# Patient Record
Sex: FEMALE | Race: BLACK OR AFRICAN AMERICAN | NOT HISPANIC OR LATINO | URBAN - METROPOLITAN AREA
[De-identification: names, ages, dates, MRNs, and addresses within clinical notes are randomized per-mention and may not be internally consistent; named-entity substitution may affect disease eponyms.]

---

## 2021-06-07 ENCOUNTER — INPATIENT (INPATIENT)
Facility: HOSPITAL | Age: 66
LOS: 1 days | Discharge: HOME | End: 2021-06-09
Attending: INTERNAL MEDICINE
Payer: SELF-PAY

## 2021-06-07 VITALS
OXYGEN SATURATION: 97 % | TEMPERATURE: 98 F | SYSTOLIC BLOOD PRESSURE: 174 MMHG | DIASTOLIC BLOOD PRESSURE: 120 MMHG | RESPIRATION RATE: 18 BRPM | HEART RATE: 102 BPM

## 2021-06-07 DIAGNOSIS — I83.90 ASYMPTOMATIC VARICOSE VEINS OF UNSPECIFIED LOWER EXTREMITY: ICD-10-CM

## 2021-06-07 DIAGNOSIS — R07.9 CHEST PAIN, UNSPECIFIED: ICD-10-CM

## 2021-06-07 DIAGNOSIS — I10 ESSENTIAL (PRIMARY) HYPERTENSION: ICD-10-CM

## 2021-06-07 DIAGNOSIS — Z29.9 ENCOUNTER FOR PROPHYLACTIC MEASURES, UNSPECIFIED: ICD-10-CM

## 2021-06-07 DIAGNOSIS — D64.9 ANEMIA, UNSPECIFIED: ICD-10-CM

## 2021-06-07 DIAGNOSIS — Z98.890 OTHER SPECIFIED POSTPROCEDURAL STATES: Chronic | ICD-10-CM

## 2021-06-07 LAB
ALBUMIN SERPL ELPH-MCNC: 3.8 G/DL — SIGNIFICANT CHANGE UP (ref 3.3–5)
ALP SERPL-CCNC: 298 U/L — HIGH (ref 40–120)
ALT FLD-CCNC: <5 U/L — SIGNIFICANT CHANGE UP (ref 4–33)
ANION GAP SERPL CALC-SCNC: 11 MMOL/L — SIGNIFICANT CHANGE UP (ref 7–14)
AST SERPL-CCNC: 15 U/L — SIGNIFICANT CHANGE UP (ref 4–32)
BASE EXCESS BLDV CALC-SCNC: -0.2 MMOL/L — SIGNIFICANT CHANGE UP (ref -3–2)
BASOPHILS # BLD AUTO: 0.01 K/UL — SIGNIFICANT CHANGE UP (ref 0–0.2)
BASOPHILS NFR BLD AUTO: 0.1 % — SIGNIFICANT CHANGE UP (ref 0–2)
BILIRUB SERPL-MCNC: 0.3 MG/DL — SIGNIFICANT CHANGE UP (ref 0.2–1.2)
BLOOD GAS VENOUS - CREATININE: 0.8 MG/DL — SIGNIFICANT CHANGE UP (ref 0.5–1.3)
BLOOD GAS VENOUS COMPREHENSIVE RESULT: SIGNIFICANT CHANGE UP
BUN SERPL-MCNC: 14 MG/DL — SIGNIFICANT CHANGE UP (ref 7–23)
CALCIUM SERPL-MCNC: 8.9 MG/DL — SIGNIFICANT CHANGE UP (ref 8.4–10.5)
CHLORIDE BLDV-SCNC: 108 MMOL/L — SIGNIFICANT CHANGE UP (ref 96–108)
CHLORIDE SERPL-SCNC: 108 MMOL/L — HIGH (ref 98–107)
CO2 SERPL-SCNC: 22 MMOL/L — SIGNIFICANT CHANGE UP (ref 22–31)
CREAT SERPL-MCNC: 0.68 MG/DL — SIGNIFICANT CHANGE UP (ref 0.5–1.3)
EOSINOPHIL # BLD AUTO: 0.05 K/UL — SIGNIFICANT CHANGE UP (ref 0–0.5)
EOSINOPHIL NFR BLD AUTO: 0.7 % — SIGNIFICANT CHANGE UP (ref 0–6)
GAS PNL BLDV: 144 MMOL/L — SIGNIFICANT CHANGE UP (ref 136–146)
GLUCOSE BLDV-MCNC: 119 MG/DL — HIGH (ref 70–99)
GLUCOSE SERPL-MCNC: 114 MG/DL — HIGH (ref 70–99)
HCO3 BLDV-SCNC: 24 MMOL/L — SIGNIFICANT CHANGE UP (ref 20–27)
HCT VFR BLD CALC: 35.7 % — SIGNIFICANT CHANGE UP (ref 34.5–45)
HCT VFR BLDA CALC: 34.7 % — SIGNIFICANT CHANGE UP (ref 34.5–46.5)
HGB BLD CALC-MCNC: 11.2 G/DL — LOW (ref 11.5–15.5)
HGB BLD-MCNC: 10.8 G/DL — LOW (ref 11.5–15.5)
IANC: 5.11 K/UL — SIGNIFICANT CHANGE UP (ref 1.5–8.5)
IMM GRANULOCYTES NFR BLD AUTO: 0.4 % — SIGNIFICANT CHANGE UP (ref 0–1.5)
LACTATE BLDV-MCNC: 1.2 MMOL/L — SIGNIFICANT CHANGE UP (ref 0.5–2)
LYMPHOCYTES # BLD AUTO: 1.61 K/UL — SIGNIFICANT CHANGE UP (ref 1–3.3)
LYMPHOCYTES # BLD AUTO: 22.4 % — SIGNIFICANT CHANGE UP (ref 13–44)
MCHC RBC-ENTMCNC: 26.1 PG — LOW (ref 27–34)
MCHC RBC-ENTMCNC: 30.3 GM/DL — LOW (ref 32–36)
MCV RBC AUTO: 86.2 FL — SIGNIFICANT CHANGE UP (ref 80–100)
MONOCYTES # BLD AUTO: 0.39 K/UL — SIGNIFICANT CHANGE UP (ref 0–0.9)
MONOCYTES NFR BLD AUTO: 5.4 % — SIGNIFICANT CHANGE UP (ref 2–14)
NEUTROPHILS # BLD AUTO: 5.11 K/UL — SIGNIFICANT CHANGE UP (ref 1.8–7.4)
NEUTROPHILS NFR BLD AUTO: 71 % — SIGNIFICANT CHANGE UP (ref 43–77)
NRBC # BLD: 0 /100 WBCS — SIGNIFICANT CHANGE UP
NRBC # FLD: 0 K/UL — SIGNIFICANT CHANGE UP
NT-PROBNP SERPL-SCNC: 669 PG/ML — HIGH
PCO2 BLDV: 39 MMHG — LOW (ref 41–51)
PH BLDV: 7.4 — SIGNIFICANT CHANGE UP (ref 7.32–7.43)
PLATELET # BLD AUTO: 246 K/UL — SIGNIFICANT CHANGE UP (ref 150–400)
PO2 BLDV: 122 MMHG — HIGH (ref 35–40)
POTASSIUM BLDV-SCNC: 3.9 MMOL/L — SIGNIFICANT CHANGE UP (ref 3.4–4.5)
POTASSIUM SERPL-MCNC: 3.9 MMOL/L — SIGNIFICANT CHANGE UP (ref 3.5–5.3)
POTASSIUM SERPL-SCNC: 3.9 MMOL/L — SIGNIFICANT CHANGE UP (ref 3.5–5.3)
PROT SERPL-MCNC: 7.2 G/DL — SIGNIFICANT CHANGE UP (ref 6–8.3)
RBC # BLD: 4.14 M/UL — SIGNIFICANT CHANGE UP (ref 3.8–5.2)
RBC # FLD: 13.6 % — SIGNIFICANT CHANGE UP (ref 10.3–14.5)
SAO2 % BLDV: 98.9 % — HIGH (ref 60–85)
SARS-COV-2 RNA SPEC QL NAA+PROBE: SIGNIFICANT CHANGE UP
SODIUM SERPL-SCNC: 141 MMOL/L — SIGNIFICANT CHANGE UP (ref 135–145)
TROPONIN T, HIGH SENSITIVITY RESULT: 36 NG/L — SIGNIFICANT CHANGE UP
TROPONIN T, HIGH SENSITIVITY RESULT: 36 NG/L — SIGNIFICANT CHANGE UP
TROPONIN T, HIGH SENSITIVITY RESULT: 42 NG/L — SIGNIFICANT CHANGE UP
TSH SERPL-MCNC: 1.81 UIU/ML — SIGNIFICANT CHANGE UP (ref 0.27–4.2)
WBC # BLD: 7.2 K/UL — SIGNIFICANT CHANGE UP (ref 3.8–10.5)
WBC # FLD AUTO: 7.2 K/UL — SIGNIFICANT CHANGE UP (ref 3.8–10.5)

## 2021-06-07 PROCEDURE — 93010 ELECTROCARDIOGRAM REPORT: CPT

## 2021-06-07 PROCEDURE — 71046 X-RAY EXAM CHEST 2 VIEWS: CPT | Mod: 26

## 2021-06-07 PROCEDURE — 99223 1ST HOSP IP/OBS HIGH 75: CPT

## 2021-06-07 PROCEDURE — 93306 TTE W/DOPPLER COMPLETE: CPT | Mod: 26

## 2021-06-07 PROCEDURE — 93970 EXTREMITY STUDY: CPT | Mod: 26

## 2021-06-07 PROCEDURE — 99285 EMERGENCY DEPT VISIT HI MDM: CPT | Mod: 25

## 2021-06-07 RX ORDER — AMLODIPINE BESYLATE 2.5 MG/1
10 TABLET ORAL DAILY
Refills: 0 | Status: DISCONTINUED | OUTPATIENT
Start: 2021-06-07 | End: 2021-06-09

## 2021-06-07 RX ORDER — ASPIRIN/CALCIUM CARB/MAGNESIUM 324 MG
81 TABLET ORAL DAILY
Refills: 0 | Status: DISCONTINUED | OUTPATIENT
Start: 2021-06-07 | End: 2021-06-09

## 2021-06-07 RX ORDER — CARVEDILOL PHOSPHATE 80 MG/1
3.12 CAPSULE, EXTENDED RELEASE ORAL EVERY 12 HOURS
Refills: 0 | Status: DISCONTINUED | OUTPATIENT
Start: 2021-06-07 | End: 2021-06-09

## 2021-06-07 RX ORDER — AMLODIPINE BESYLATE 2.5 MG/1
1 TABLET ORAL
Qty: 0 | Refills: 0 | DISCHARGE

## 2021-06-07 RX ORDER — ENOXAPARIN SODIUM 100 MG/ML
40 INJECTION SUBCUTANEOUS DAILY
Refills: 0 | Status: DISCONTINUED | OUTPATIENT
Start: 2021-06-07 | End: 2021-06-09

## 2021-06-07 RX ADMIN — Medication 81 MILLIGRAM(S): at 10:34

## 2021-06-07 RX ADMIN — ENOXAPARIN SODIUM 40 MILLIGRAM(S): 100 INJECTION SUBCUTANEOUS at 19:01

## 2021-06-07 RX ADMIN — AMLODIPINE BESYLATE 10 MILLIGRAM(S): 2.5 TABLET ORAL at 10:34

## 2021-06-07 RX ADMIN — CARVEDILOL PHOSPHATE 3.12 MILLIGRAM(S): 80 CAPSULE, EXTENDED RELEASE ORAL at 19:01

## 2021-06-07 NOTE — H&P ADULT - PROBLEM SELECTOR PLAN 1
-No active CP at present. Character of pain could be exertion CP (i.e. angina).   -Trend trop till peak.  -Check ECHO, stress ECHO.   -Remote telemetry. Repeat EKG if CP.   -Cont ASA for now.  -Start metoprolol 12.5 BID. -No active CP at present.  -Mild troponin elevation could be 2' T2MI.  -Trend trop till peak. Check ECHO, stress ECHO.   -Remote telemetry. Repeat EKG if CP.   -Cont ASA for now.  -Start metoprolol 12.5 BID pending stress.  -Per pt - had contrast imaging in Nov 2020 and developed tongue/lip swelling. Spoke w/ radiology here - would not approve contrast study. Lower suspicion for dissection at this time given spectrum of sx. Current imaging does not show widened mediastinum. Will check bilateral arm BPs. Will also obtain records from [facility] for study done at the time. Can check MRA of chest if further workup is indicated as discuss w/ rads. -No active CP at present.  -Mild troponin elevation could be 2' T2MI.  -Trend trop till peak. Check ECHO, stress ECHO.   -Remote telemetry. Repeat EKG if CP.   -Cont ASA for now.  -Start carvedilol pending stress.  -Per pt - had contrast imaging in Nov 2020 and developed tongue/lip swelling. Spoke w/ radiology here - would not approve contrast study. Lower suspicion for dissection at this time given spectrum of sx. Current imaging does not show widened mediastinum. Will check bilateral arm BPs. Will also obtain records from [facility] for study done at the time. Can check MRA of chest if further workup is indicated as discuss w/ rads.

## 2021-06-07 NOTE — H&P ADULT - PROBLEM SELECTOR PLAN 3
-No prior baseline. No overt blood loss.  -Will check iron studies.  -Pt will need OP follow up to ensure age-appropriate ca screening.

## 2021-06-07 NOTE — ED ADULT NURSE REASSESSMENT NOTE - NS ED NURSE REASSESS COMMENT FT1
patient aaox4. ambulatory w/o assist. came in with chest pain, headache, nausea, diaphoresis, lewis. currently in nad. denies chest pain, dizziness, weakness, numbness, tingling, lightheadedness, n/v, sob, at this time. cardiac monitor nsr. respirations even and unlabored. made comfortable. safety maintained. call bell within reach. will continue to monitor.

## 2021-06-07 NOTE — H&P ADULT - NSHPPHYSICALEXAM_GEN_ALL_CORE
Vital Signs Last 24 Hrs  T(C): 36.7 (07 Jun 2021 06:18), Max: 36.9 (07 Jun 2021 00:20)  T(F): 98 (07 Jun 2021 06:18), Max: 98.5 (07 Jun 2021 00:20)  HR: 81 (07 Jun 2021 03:40) (81 - 102)  BP: 172/107 (07 Jun 2021 06:18) (172/107 - 175/107)  BP(mean): --  RR: 20 (07 Jun 2021 06:18) (17 - 20)  SpO2: 98% (07 Jun 2021 06:18) (97% - 99%)    Gen- In bed, comfortable, NAD  Eyes- EOMI, PERRLA, nonicteric.  EMNT- Fair dentition. MMM. No tonsilar exudates. No posterior pharynx erythema.  Neck- Supple. No masses. No tracheal deviation.  Resp- CTAB, good effort. No r/r/w. No accessory muscle use.  CVS- Tachy, RRR, S1S2, no g/r/m. L>R LE edema.  GI- Soft obese abd, NT, ND, +BSx4. No HSM.  MSK- No C/C. ROM intact. No crepitus.  Neuro- CN II-XII intact. Speech fluent/face symmetric. Sensation intact.  Skin- No rashes/ulcers. Warm/moist.  Psych- AAOx3. Appropriate mood/affect.

## 2021-06-07 NOTE — ED ADULT TRIAGE NOTE - CHIEF COMPLAINT QUOTE
pt did not take her BP medications today, while she was walking up stairs when she developed mid sternal chest pain described as burning. patient received 324 of ASA and 2 sprays of nitro. patient states her chest pain is resolved but c/o headache. pt did not take her BP medications today, while she was walking up stairs when she developed mid sternal chest pain described as burning. patient received 324 of ASA and 2 sprays of nitro. patient states her chest pain is resolved but c/o headache. 20g iv placed by EMS.

## 2021-06-07 NOTE — CONSULT NOTE ADULT - ASSESSMENT
65F with PMH of uncontrolled HTN, varicose veins  presenting w/ CP.     1. Chest pain   -with atypical features  -HS trop 36-42, no acs on ECG, chest xray unremarkable   -uncontrolled HTN noted   -Pending echo to eval LV fx  -+CAD risk factors, awaiting stress test for ischemic eval   -low dose ASA   -check FLP, ha1c     2. Uncontrolled HTN   -c/w norvasc 10 mg daily  -start coreg  3.125 mg BID     3. Varicose veins  -reports chronic LE edema  -check dopplers     dvt ppx

## 2021-06-07 NOTE — H&P ADULT - NSHPSOCIALHISTORY_GEN_ALL_CORE
No ETOH, drug, tobacco use.  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   Lives with  and son.

## 2021-06-07 NOTE — CONSULT NOTE ADULT - TIME BILLING
Patient seen and examined.  Agree with above NP note.  65F with PMH of uncontrolled HTN, varicose veins  presenting w/ CP.     #Chest pain   -with atypical features but with CAD risk factors  -uncontrolled HTN noted   -echo to eval LV fx  -awaiting stress test for ischemic eval   -low dose ASA   -check FLP, ha1c     #Uncontrolled HTN   -c/w norvasc 10 mg daily  -start coreg  3.125 mg BID     #Varicose veins  -reports chronic LE edema  -no dvt on dopplers     #anemia w/u  dvt ppx

## 2021-06-07 NOTE — CONSULT NOTE ADULT - SUBJECTIVE AND OBJECTIVE BOX
CARDIOLOGY CONSULT - Dr. Gauthier         HPI:  65F with PMH of uncontrolled HTN presenting w/ CP. Pt was at work overnight when she climb 3 flight of stairs. Immediately after, she experienced a burning sensation that started on her  left  side w/ radiation to the left shoulder blade and left arm. Pt had associated diaphoresis.  After administration of ASA and NTG while enroute, the patient reports symptomatic improvement. She had a prior episode of this back on Thursday. Episode lasted a few minutes and went away on its own after she leaned forward. She reports longstanding poor control of HTN. Her usual BPs at home averages 190s/100s. She reports following at [facility] for her primary care, but does not recall name of her MD. She also reports hospitalization back in Nov 2020 for high blood pressure at [facility]. Pt recalled getting CT and got contrast leading to drug reaction (lip/tongue swelling). There have been no changes to her medications. She reports baseline 2 pillow orthopnea without PND. She reports baseline left leg swelling 2' varicose vein. Otherwise, pt denied any fevers/chills, NV, vision changes, HA, sore throat, cough, SOB, abdominal pain, diarrhea, dysuria. She reports no travels, recent sick contacts. No recent cardiac hx/ work up   ROS otherwise negative       PAST MEDICAL & SURGICAL HISTORY:  HTN (hypertension)    History of varicose vein ligation and stripping            PREVIOUS DIAGNOSTIC TESTING:    [ ] Echocardiogram:  [ ]  Catheterization:  [ ] Stress Test:  	    MEDICATIONS:  Home Medications:  amLODIPine 10 mg oral tablet: 1 tab(s) orally once a day (07 Jun 2021 09:18)      MEDICATIONS  (STANDING):  amLODIPine   Tablet 10 milliGRAM(s) Oral daily  aspirin enteric coated 81 milliGRAM(s) Oral daily  enoxaparin Injectable 40 milliGRAM(s) SubCutaneous daily      FAMILY HISTORY:  No pertinent family history in first degree relatives        SOCIAL HISTORY:    [x ] Non-smoker  [ ] Smoker  [ ] Alcohol    Allergies    contrast media (iodine-based) (Swelling)    Intolerances    	    REVIEW OF SYSTEMS:  CONSTITUTIONAL: No fever, weight loss, or fatigue  EYES: No eye pain, visual disturbances, or discharge  ENMT:  No difficulty hearing, tinnitus, vertigo; No sinus or throat pain  NECK: No pain or stiffness  RESPIRATORY: No cough, wheezing, chills or hemoptysis; No Shortness of Breath  CARDIOVASCULAR: see hpi  GASTROINTESTINAL: No abdominal or epigastric pain. No nausea, vomiting, or hematemesis; No diarrhea or constipation. No melena or hematochezia.  GENITOURINARY: No dysuria, frequency, hematuria, or incontinence  NEUROLOGICAL: No headaches, memory loss, loss of strength, numbness, or tremors  SKIN: No itching, burning, rashes, or lesions   	    [x ] All others negative	  [ ] Unable to obtain    PHYSICAL EXAM:  T(C): 36.3 (06-07-21 @ 10:24), Max: 36.9 (06-07-21 @ 00:20)  HR: 91 (06-07-21 @ 10:24) (81 - 102)  BP: 179/98 (06-07-21 @ 10:24) (172/107 - 179/98)  RR: 16 (06-07-21 @ 10:24) (16 - 20)  SpO2: 98% (06-07-21 @ 10:24) (97% - 99%)  Wt(kg): --  I&O's Summary      Appearance: Normal	  Psychiatry: A & O x 3, Mood & affect appropriate  HEENT:   Normal oral mucosa, PERRL, EOMI	  Lymphatic: No lymphadenopathy  Cardiovascular: Normal S1 S2,RRR, No JVD, No murmurs  Respiratory: Lungs clear to auscultation	  Gastrointestinal:  Soft, Non-tender, + BS	  Skin: No rashes, No ecchymoses, No cyanosis	  Neurologic: Non-focal  Extremities:  + le EDEMA     TELEMETRY: 	    ECG:  nsr hr 95 bpm, min criteria for LVH  possible ant infarct   RADIOLOGY:    < from: Xray Chest 2 Views PA/Lat (06.07.21 @ 03:14) >  FINDINGS:    LUNGS: The lungs are clear.    PLEURA: No pleural effusion or pneumothorax.    HEART AND MEDIASTINUM: Cardiomediastinal silhouette size is within normal limits.    SKELETON: No significant abnormality.      IMPRESSION:    Clear lungs.      OTHER: 	  	  LABS:	 	    CARDIAC MARKERS:  Troponin T, High Sensitivity Result: 42 ng/L (06-07 @ 04:29)  Troponin T, High Sensitivity Result: 36 ng/L (06-07 @ 03:18)                                  10.8   7.20  )-----------( 246      ( 07 Jun 2021 03:18 )             35.7     06-07    141  |  108<H>  |  14  ----------------------------<  114<H>  3.9   |  22  |  0.68    Ca    8.9      07 Jun 2021 03:18    TPro  7.2  /  Alb  3.8  /  TBili  0.3  /  DBili  x   /  AST  15  /  ALT  <5  /  AlkPhos  298<H>  06-07      proBNP: Serum Pro-Brain Natriuretic Peptide: 669 pg/mL (06-07 @ 03:18)    Lipid Profile:   HgA1c:   TSH:

## 2021-06-07 NOTE — H&P ADULT - ASSESSMENT
65F with PMH of HTN who presents w/ CP. On arrival, pt noted to markedly hypertensives, but this appears to be her baseline. Initial labs notable for hsTrop 36 -> 42. Chest XR showed no acute imaging abnormalities. EKG showed no acute ischemic changes. Given risks factors - admitted for r/o ACS. 65F with PMH of HTN who presents w/ CP. On arrival, pt noted to markedly hypertensives, but this appears to be her baseline. Initial labs notable for hsTrop 36 -> 42. Chest XR showed no acute imaging abnormalities. EKG showed no acute ischemic changes. Exertional nature concerning for anginal CP. Given risks factors - admitted for r/o ACS.

## 2021-06-07 NOTE — ED ADULT NURSE NOTE - OBJECTIVE STATEMENT
pt a&o x 3 c/o chest pain at 2240 last night. states she was going up the stairs when it occurred. non radiating. denies sob, n/v, ha or dizziness. no current complaints. md assessed. labs drawn and sent. arrived with left hand 20g by ems. safety maintained. will endorse to primary rn.

## 2021-06-07 NOTE — H&P ADULT - PROBLEM SELECTOR PLAN 2
-Hx of poor control.  -Cont home dose of amlodipine.  -Can introduce second agent - will reassess response after restarting home med.  -Check FLP, A1c, TSH -Hx of poor control. Baseline usually 190s/100s per pt.  -Cont home dose of amlodipine.  -Start metoprolol pending stress.  -Check FLP, A1c, TSH

## 2021-06-07 NOTE — ED ADULT NURSE REASSESSMENT NOTE - NS ED NURSE REASSESS COMMENT FT1
patient aaox4. ambulatory w/o assist. came in with chest pain, headache, nausea, diaphoresis. currently in nad. denies chest pain, dizziness, weakness, numbness, tingling, lightheadedness, n/v, sob, at this time. cardiac monitor nsr. made comfortable. saftey maintained. call bell within reach. will continue to monitor.

## 2021-06-07 NOTE — ED PROVIDER NOTE - NS ED ROS FT
Gen: No fever, normal appetite  Eyes: No eye irritation or discharge  ENT: No ear pain, congestion, sore throat  Resp: No cough or trouble breathing  Cardiovascular: chest pain   Gastroenteric: No nausea/vomiting, diarrhea, constipation  :  No change in urine output; no dysuria  MS: No joint or muscle pain  Skin: No rashes  Neuro: headache after nitro   Remainder negative, except as per the HPI

## 2021-06-07 NOTE — ED PROVIDER NOTE - PHYSICAL EXAMINATION
GEN - NAD; well appearing; A+O x3   HEAD - NC/AT     EYES - EOMI, no conjunctival pallor, no scleral icterus  ENT -   right ear significant keloid, mucous membranes  moist , no discharge      NECK - Neck supple  PULM - CTA b/l,  symmetric breath sounds  COR -  RRR, S1 S2, no murmurs  ABD - , ND, NT, soft, no guarding, no rebound, no masses    BACK - no CVA tenderness, nontender spine     EXTREMS - no edema, no deformity, warm and well perfused    SKIN - no rash or bruising      NEUROLOGIC - alert, sensation nl, motor 5/5 RUE/LUE/RLE/LLE

## 2021-06-07 NOTE — H&P ADULT - HISTORY OF PRESENT ILLNESS
65F with PMH of uncontrolled HTN presenting w/ CP. Pt was at work overnight when she climb 3 flight of stairs. Immediately after, she experienced left sided chest pain w/ radiation to the left shoulder blade and left arm. Pt had associated diaphoresis. She characterized the pain as "burning" and as if something was "eating" inside of her. EMS was called. After administration of ASA and NTG while enroute, the patient reports symptomatic improvement. She had a prior episode of this back on Thursday. Episode lasted a few minutes and went away on its own. She reports longstanding poor control of HTN. Her usual BPs at home averages 190s/100s. She reports following at [facility] for her primary care, but does not recall name of her MD. She also reports hospitalization back in Nov 2020 for high blood pressure at [facility]. Pt recalled getting CT and got contrast leading to drug reaction (lip/tongue swelling). There have been no changes to her medications. She reports baseline 2 pillow orthopnea without PND. She reports baseline left leg swelling 2' varicose vein. Otherwise, pt denied any fevers/chills, NV, vision changes, HA, sore throat, cough, SOB, abdominal pain, diarrhea, dysuria. She reports no travels, recent sick contacts.     ED Course: 174/120, 102, 18, 98.5F, 97% RA. No meds given.

## 2021-06-07 NOTE — ED ADULT NURSE REASSESSMENT NOTE - NS ED NURSE REASSESS COMMENT FT1
Pt A&ox4 resting comfortably. Breathing even and unlabored. Pt denies headache, chest pain, SOB, or dizziness at this time. Will give report to CDU

## 2021-06-07 NOTE — ED ADULT NURSE NOTE - CHIEF COMPLAINT QUOTE
pt did not take her BP medications today, while she was walking up stairs when she developed mid sternal chest pain described as burning. patient received 324 of ASA and 2 sprays of nitro. patient states her chest pain is resolved but c/o headache. 20g iv placed by EMS.

## 2021-06-07 NOTE — ED PROVIDER NOTE - CLINICAL SUMMARY MEDICAL DECISION MAKING FREE TEXT BOX
pt with chest pain w/ hx of concerning for stable angina w/ nonspecific changes on EKG. Will check troponin, ekg, monitor on telemetry, rule out acute coronary syndrome.

## 2021-06-07 NOTE — H&P ADULT - NSHPLABSRESULTS_GEN_ALL_CORE
10.8   7.20  )-----------( 246      ( 07 Jun 2021 03:18 )             35.7     06-07    141  |  108<H>  |  14  ----------------------------<  114<H>  3.9   |  22  |  0.68    Ca    8.9      07 Jun 2021 03:18    TPro  7.2  /  Alb  3.8  /  TBili  0.3  /  DBili  x   /  AST  15  /  ALT  <5  /  AlkPhos  298<H>  06-07              COVID-19 PCR: NotDetec (07 Jun 2021 04:42)    Serum Pro-Brain Natriuretic Peptide: 669 pg/mL (06-07 @ 03:18)        Chest XR - personally reviewed - no acute cardiopulmonary processes. No infiltrates. No widened mediastinum. Official read reviewed.    EKG - personally reviewed - SR 95, no acute ST/T changes. .

## 2021-06-08 ENCOUNTER — TRANSCRIPTION ENCOUNTER (OUTPATIENT)
Age: 66
End: 2021-06-08

## 2021-06-08 LAB
A1C WITH ESTIMATED AVERAGE GLUCOSE RESULT: 5.4 % — SIGNIFICANT CHANGE UP (ref 4–5.6)
ANION GAP SERPL CALC-SCNC: 14 MMOL/L — SIGNIFICANT CHANGE UP (ref 7–14)
BUN SERPL-MCNC: 21 MG/DL — SIGNIFICANT CHANGE UP (ref 7–23)
CALCIUM SERPL-MCNC: 9.1 MG/DL — SIGNIFICANT CHANGE UP (ref 8.4–10.5)
CHLORIDE SERPL-SCNC: 104 MMOL/L — SIGNIFICANT CHANGE UP (ref 98–107)
CHOLEST SERPL-MCNC: 189 MG/DL — SIGNIFICANT CHANGE UP
CO2 SERPL-SCNC: 23 MMOL/L — SIGNIFICANT CHANGE UP (ref 22–31)
COVID-19 SPIKE DOMAIN AB INTERP: POSITIVE
COVID-19 SPIKE DOMAIN ANTIBODY RESULT: >250 U/ML — HIGH
CREAT SERPL-MCNC: 0.88 MG/DL — SIGNIFICANT CHANGE UP (ref 0.5–1.3)
ESTIMATED AVERAGE GLUCOSE: 108 MG/DL — SIGNIFICANT CHANGE UP (ref 68–114)
FERRITIN SERPL-MCNC: 141 NG/ML — SIGNIFICANT CHANGE UP (ref 15–150)
GLUCOSE SERPL-MCNC: 101 MG/DL — HIGH (ref 70–99)
HCT VFR BLD CALC: 37.5 % — SIGNIFICANT CHANGE UP (ref 34.5–45)
HCV AB S/CO SERPL IA: 0.46 S/CO — SIGNIFICANT CHANGE UP (ref 0–0.99)
HCV AB SERPL-IMP: SIGNIFICANT CHANGE UP
HDLC SERPL-MCNC: 65 MG/DL — SIGNIFICANT CHANGE UP
HGB BLD-MCNC: 11.5 G/DL — SIGNIFICANT CHANGE UP (ref 11.5–15.5)
IRON SATN MFR SERPL: 23 % — SIGNIFICANT CHANGE UP (ref 14–50)
IRON SATN MFR SERPL: 58 UG/DL — SIGNIFICANT CHANGE UP (ref 30–160)
LIPID PNL WITH DIRECT LDL SERPL: 108 MG/DL — HIGH
MAGNESIUM SERPL-MCNC: 2.1 MG/DL — SIGNIFICANT CHANGE UP (ref 1.6–2.6)
MCHC RBC-ENTMCNC: 26.7 PG — LOW (ref 27–34)
MCHC RBC-ENTMCNC: 30.7 GM/DL — LOW (ref 32–36)
MCV RBC AUTO: 87 FL — SIGNIFICANT CHANGE UP (ref 80–100)
NON HDL CHOLESTEROL: 124 MG/DL — SIGNIFICANT CHANGE UP
NRBC # BLD: 0 /100 WBCS — SIGNIFICANT CHANGE UP
NRBC # FLD: 0 K/UL — SIGNIFICANT CHANGE UP
PHOSPHATE SERPL-MCNC: 4.2 MG/DL — SIGNIFICANT CHANGE UP (ref 2.5–4.5)
PLATELET # BLD AUTO: 254 K/UL — SIGNIFICANT CHANGE UP (ref 150–400)
POTASSIUM SERPL-MCNC: 3.7 MMOL/L — SIGNIFICANT CHANGE UP (ref 3.5–5.3)
POTASSIUM SERPL-SCNC: 3.7 MMOL/L — SIGNIFICANT CHANGE UP (ref 3.5–5.3)
RBC # BLD: 4.31 M/UL — SIGNIFICANT CHANGE UP (ref 3.8–5.2)
RBC # FLD: 13.6 % — SIGNIFICANT CHANGE UP (ref 10.3–14.5)
SARS-COV-2 IGG+IGM SERPL QL IA: >250 U/ML — HIGH
SARS-COV-2 IGG+IGM SERPL QL IA: POSITIVE
SODIUM SERPL-SCNC: 141 MMOL/L — SIGNIFICANT CHANGE UP (ref 135–145)
TIBC SERPL-MCNC: 250 UG/DL — SIGNIFICANT CHANGE UP (ref 220–430)
TRIGL SERPL-MCNC: 78 MG/DL — SIGNIFICANT CHANGE UP
UIBC SERPL-MCNC: 192 UG/DL — SIGNIFICANT CHANGE UP (ref 110–370)
WBC # BLD: 4.75 K/UL — SIGNIFICANT CHANGE UP (ref 3.8–10.5)
WBC # FLD AUTO: 4.75 K/UL — SIGNIFICANT CHANGE UP (ref 3.8–10.5)

## 2021-06-08 PROCEDURE — 93018 CV STRESS TEST I&R ONLY: CPT | Mod: GC

## 2021-06-08 PROCEDURE — 78452 HT MUSCLE IMAGE SPECT MULT: CPT | Mod: 26

## 2021-06-08 PROCEDURE — 93016 CV STRESS TEST SUPVJ ONLY: CPT | Mod: GC

## 2021-06-08 RX ADMIN — ENOXAPARIN SODIUM 40 MILLIGRAM(S): 100 INJECTION SUBCUTANEOUS at 17:40

## 2021-06-08 RX ADMIN — AMLODIPINE BESYLATE 10 MILLIGRAM(S): 2.5 TABLET ORAL at 06:11

## 2021-06-08 RX ADMIN — CARVEDILOL PHOSPHATE 3.12 MILLIGRAM(S): 80 CAPSULE, EXTENDED RELEASE ORAL at 17:40

## 2021-06-08 RX ADMIN — CARVEDILOL PHOSPHATE 3.12 MILLIGRAM(S): 80 CAPSULE, EXTENDED RELEASE ORAL at 06:11

## 2021-06-08 RX ADMIN — Medication 81 MILLIGRAM(S): at 17:40

## 2021-06-08 NOTE — DISCHARGE NOTE PROVIDER - NSDCCPCAREPLAN_GEN_ALL_CORE_FT
PRINCIPAL DISCHARGE DIAGNOSIS  Diagnosis: Acute chest pain  Assessment and Plan of Treatment: Your cardiac cath showed that you have normal coronary arteries. Please follow up with the cardiology clinic.       PRINCIPAL DISCHARGE DIAGNOSIS  Diagnosis: Acute chest pain  Assessment and Plan of Treatment: Your cardiac cath showed that you have mild disease in your arteries. Please follow up with the cardiology clinic.

## 2021-06-08 NOTE — DISCHARGE NOTE PROVIDER - HOSPITAL COURSE
this appears to be her baseline. Initial labs notable for hsTrop 36 -> 42. Chest XR showed no acute imaging abnormalities. EKG showed no acute ischemic changes. Exertional nature concerning for anginal CP. Given risks factors - admitted for r/o ACS.    Chest pain.    Plan: -No active CP at present.  -Mild troponin elevation could be 2' T2MI.  -Trend trop till peak. Check ECHO, stress ECHO.   -Remote telemetry. Repeat EKG if CP.   -Cont ASA for now.  -Start carvedilol pending stress.  -Per pt - had contrast imaging in Nov 2020 and developed tongue/lip swelling. Spoke w/ radiology here - would not approve contrast study. Lower suspicion for dissection at this time given spectrum of sx. Current imaging does not show widened mediastinum. Will check bilateral arm BPs. Will also obtain records from [facility] for study done at the time. Can check MRA of chest if further workup is indicated as discuss w/ rads.   -TTE EF 49%- Moderately dilated left atrium.  LA volume index = 47, cc/m2.  Eccentric left ventricular hypertrophy (dilated left  ventricle with normal relative wall thickness).  Mild global left ventricular systolic dysfunction.  Normal right ventricular size and function.  -NST The left ventricle was enlarged. There is a small, mild to moderate defect in distal inferior wall that is reversible, suggestive of ischemia.There is a small, very  mild defect in anteroseptal wall that is reversible, suggestive of mild ischemia.   -s/p cardiac cath-------------------------------------------------------------------------      Essential hypertension.    Plan: -Hx of poor control. Baseline usually 190s/100s per pt.  -Cont home dose of amlodipine.  -Start metoprolol pending stress.  -Check FLP, A1c, TSH.      Anemia.    Plan: -No prior baseline. No overt blood loss.  -Will check iron studies.  -Pt will need OP follow up to ensure age-appropriate ca screening.      Varicose veins.    Plan: -Left leg swelling - though baseline - she does have tenderness on palpation.  -Check LE venous duplex.     Need for prophylactic measure.  Plan: DVT ppx- Lovenox.    this appears to be her baseline. Initial labs notable for hsTrop 36 -> 42. Chest XR showed no acute imaging abnormalities. EKG showed no acute ischemic changes. Exertional nature concerning for anginal CP. Given risks factors - admitted for r/o ACS.    Chest pain.    Plan: -No active CP at present.  -Mild troponin elevation could be 2' T2MI.  -Trend trop till peak. Check ECHO, stress ECHO.   -Remote telemetry. Repeat EKG if CP.   -Cont ASA for now.  -Start carvedilol pending stress.  -Per pt - had contrast imaging in Nov 2020 and developed tongue/lip swelling. Spoke w/ radiology here - would not approve contrast study. Lower suspicion for dissection at this time given spectrum of sx. Current imaging does not show widened mediastinum. Will check bilateral arm BPs. Will also obtain records from [facility] for study done at the time. Can check MRA of chest if further workup is indicated as discuss w/ rads.   -TTE EF 49%- Moderately dilated left atrium.  LA volume index = 47, cc/m2.  Eccentric left ventricular hypertrophy (dilated left  ventricle with normal relative wall thickness).  Mild global left ventricular systolic dysfunction.  Normal right ventricular size and function.  -NST The left ventricle was enlarged. There is a small, mild to moderate defect in distal inferior wall that is reversible, suggestive of ischemia.There is a small, very  mild defect in anteroseptal wall that is reversible, suggestive of mild ischemia.   -s/p cardiac cath which showed NCA    Patient is now medically stable for discharge home.

## 2021-06-08 NOTE — PROGRESS NOTE ADULT - ASSESSMENT
65F with PMH of uncontrolled HTN, varicose veins presenting w/ CP.     #Chest pain   -with atypical features  -HS trop 36-42, no acs on ECG, chest xray unremarkable -  -uncontrolled HTN noted. now improved   -echo w/ mild global LV sys dysfunction, EF 49%  -F/U stress results   -low dose ASA     2. Uncontrolled HTN   -improved   -c/w norvasc 10 mg daily, coreg  3.125 mg BID     3. Varicose veins  -reports chronic LE edema  -LE doppler negative for DVT.      dvt ppx   65F with PMH of uncontrolled HTN, varicose veins presenting w/ CP.     #Chest pain   -with atypical features  -HS trop 36-42, no acs on ECG, chest xray unremarkable -  -uncontrolled HTN noted. now improved   -echo w/ mild global LV sys dysfunction, EF 49%  -F/U stress results   -low dose ASA     #Uncontrolled HTN   -improved   -c/w norvasc 10 mg daily, coreg  3.125 mg BID     #Varicose veins  -reports chronic LE edema  -LE doppler negative for DVT.      dvt ppx

## 2021-06-08 NOTE — DISCHARGE NOTE PROVIDER - NSDCMRMEDTOKEN_GEN_ALL_CORE_FT
amLODIPine 10 mg oral tablet: 1 tab(s) orally once a day   amLODIPine 10 mg oral tablet: 1 tab(s) orally once a day  carvedilol 3.125 mg oral tablet: 1 tab(s) orally every 12 hours   amLODIPine 10 mg oral tablet: 1 tab(s) orally once a day  aspirin 81 mg oral delayed release tablet: 1 tab(s) orally once a day  carvedilol 3.125 mg oral tablet: 1 tab(s) orally every 12 hours

## 2021-06-08 NOTE — DISCHARGE NOTE PROVIDER - CARE PROVIDER_API CALL
Peter Gauthier (MD)  Cardiovascular Disease; Interventional Cardiology; Nuclear Cardiology  [address], [address]  [place] [address]  Phone: [phone]  Fax: [phone]  Established Patient  Follow Up Time: 2 weeks   Cardiology Clinic,   Phone: [phone]  Fax: (   )    -  Follow Up Time:

## 2021-06-08 NOTE — CHART NOTE - NSCHARTNOTEFT_GEN_A_CORE
patient scheduled for cardiac catheterization on 6/9 with known IV contrast allergy. ordered for Prednisone 50mg po [id number] prior to procedure per protocol as d/w Dr JHONATHAN Murcia  NPO with meds/sip of water after midnight

## 2021-06-08 NOTE — DISCHARGE NOTE PROVIDER - PROVIDER TOKENS
PROVIDER:[TOKEN:[[id number]],FOLLOWUP:[2 weeks],ESTABLISHEDPATIENT:[T]] FREE:[LAST:[Cardiology Clinic],PHONE:[phone]],FAX:[(   )    -]]

## 2021-06-09 ENCOUNTER — TRANSCRIPTION ENCOUNTER (OUTPATIENT)
Age: 66
End: 2021-06-09

## 2021-06-09 VITALS
SYSTOLIC BLOOD PRESSURE: 149 MMHG | OXYGEN SATURATION: 99 % | HEART RATE: 100 BPM | TEMPERATURE: 98 F | DIASTOLIC BLOOD PRESSURE: 93 MMHG | RESPIRATION RATE: 18 BRPM

## 2021-06-09 LAB
ANION GAP SERPL CALC-SCNC: 12 MMOL/L — SIGNIFICANT CHANGE UP (ref 7–14)
BUN SERPL-MCNC: 19 MG/DL — SIGNIFICANT CHANGE UP (ref 7–23)
CALCIUM SERPL-MCNC: 9.1 MG/DL — SIGNIFICANT CHANGE UP (ref 8.4–10.5)
CHLORIDE SERPL-SCNC: 107 MMOL/L — SIGNIFICANT CHANGE UP (ref 98–107)
CO2 SERPL-SCNC: 23 MMOL/L — SIGNIFICANT CHANGE UP (ref 22–31)
CREAT SERPL-MCNC: 0.78 MG/DL — SIGNIFICANT CHANGE UP (ref 0.5–1.3)
GLUCOSE SERPL-MCNC: 141 MG/DL — HIGH (ref 70–99)
HCT VFR BLD CALC: 37.2 % — SIGNIFICANT CHANGE UP (ref 34.5–45)
HGB BLD-MCNC: 11.6 G/DL — SIGNIFICANT CHANGE UP (ref 11.5–15.5)
MAGNESIUM SERPL-MCNC: 2 MG/DL — SIGNIFICANT CHANGE UP (ref 1.6–2.6)
MCHC RBC-ENTMCNC: 26.5 PG — LOW (ref 27–34)
MCHC RBC-ENTMCNC: 31.2 GM/DL — LOW (ref 32–36)
MCV RBC AUTO: 85.1 FL — SIGNIFICANT CHANGE UP (ref 80–100)
NRBC # BLD: 0 /100 WBCS — SIGNIFICANT CHANGE UP
NRBC # FLD: 0 K/UL — SIGNIFICANT CHANGE UP
PHOSPHATE SERPL-MCNC: 3 MG/DL — SIGNIFICANT CHANGE UP (ref 2.5–4.5)
PLATELET # BLD AUTO: 243 K/UL — SIGNIFICANT CHANGE UP (ref 150–400)
POTASSIUM SERPL-MCNC: 4.3 MMOL/L — SIGNIFICANT CHANGE UP (ref 3.5–5.3)
POTASSIUM SERPL-SCNC: 4.3 MMOL/L — SIGNIFICANT CHANGE UP (ref 3.5–5.3)
RBC # BLD: 4.37 M/UL — SIGNIFICANT CHANGE UP (ref 3.8–5.2)
RBC # FLD: 13.6 % — SIGNIFICANT CHANGE UP (ref 10.3–14.5)
SODIUM SERPL-SCNC: 142 MMOL/L — SIGNIFICANT CHANGE UP (ref 135–145)
WBC # BLD: 4.78 K/UL — SIGNIFICANT CHANGE UP (ref 3.8–10.5)
WBC # FLD AUTO: 4.78 K/UL — SIGNIFICANT CHANGE UP (ref 3.8–10.5)

## 2021-06-09 PROCEDURE — 93458 L HRT ARTERY/VENTRICLE ANGIO: CPT | Mod: 26

## 2021-06-09 RX ORDER — HYDRALAZINE HCL 50 MG
5 TABLET ORAL ONCE
Refills: 0 | Status: COMPLETED | OUTPATIENT
Start: 2021-06-09 | End: 2021-06-09

## 2021-06-09 RX ORDER — CARVEDILOL PHOSPHATE 80 MG/1
1 CAPSULE, EXTENDED RELEASE ORAL
Qty: 60 | Refills: 0
Start: 2021-06-09 | End: 2021-07-08

## 2021-06-09 RX ORDER — ASPIRIN/CALCIUM CARB/MAGNESIUM 324 MG
1 TABLET ORAL
Qty: 30 | Refills: 0
Start: 2021-06-09 | End: 2021-07-08

## 2021-06-09 RX ORDER — HYDRALAZINE HCL 50 MG
5 TABLET ORAL ONCE
Refills: 0 | Status: DISCONTINUED | OUTPATIENT
Start: 2021-06-09 | End: 2021-06-09

## 2021-06-09 RX ADMIN — Medication 81 MILLIGRAM(S): at 12:27

## 2021-06-09 RX ADMIN — Medication 5 MILLIGRAM(S): at 16:06

## 2021-06-09 RX ADMIN — AMLODIPINE BESYLATE 10 MILLIGRAM(S): 2.5 TABLET ORAL at 05:38

## 2021-06-09 RX ADMIN — Medication 50 MILLIGRAM(S): at 00:31

## 2021-06-09 RX ADMIN — CARVEDILOL PHOSPHATE 3.12 MILLIGRAM(S): 80 CAPSULE, EXTENDED RELEASE ORAL at 05:38

## 2021-06-09 RX ADMIN — CARVEDILOL PHOSPHATE 3.12 MILLIGRAM(S): 80 CAPSULE, EXTENDED RELEASE ORAL at 17:40

## 2021-06-09 RX ADMIN — Medication 50 MILLIGRAM(S): at 05:35

## 2021-06-09 RX ADMIN — Medication 50 MILLIGRAM(S): at 12:26

## 2021-06-09 NOTE — PROGRESS NOTE ADULT - TIME BILLING
Agree with above NP note.  CV stable  stress abnl  plan for cardiac cath today  pt s/p premedication for contrast allergy  Cont current BP meds

## 2021-06-09 NOTE — PROGRESS NOTE ADULT - ASSESSMENT
Echo 6/7/21: ef 49 mild global LV sys dysfunction,   Stress test 6/8/21:  evidence of ischemia, revealing markedly reduced  LV function.  Post-stress gated wall motion analysis was performed (LVEF = 37 %;LVEDV = 162 ml.), revealing  moderate diffuse hypokinesis.      a/p  65F with PMH of uncontrolled HTN, varicose veins presenting w/ CP.     #Chest pain   -with atypical features  -HS trop 36-42, no acs on ECG, chest xray unremarkable -  -uncontrolled HTN noted. now improved   -echo w/ mild global LV sys dysfunction, EF 49%  -stress test abnormal with evidence of ischemia, revealing markedly reduced  LV function.  Post-stress gated wall motion analysis was performed (LVEF = 37 %;LVEDV = 162 ml.), revealing  moderate diffuse hypokinesis.  -plan for cardiac cath , + contrast alllx- premedicate with Prednisone 50mg po [id number] initiated  prior to procedure per protocol   -c/w low dose ASA     #Uncontrolled HTN   -improved   -c/w norvasc 10 mg daily, coreg  3.125 mg BID     #Varicose veins  -reports chronic LE edema  -LE doppler negative for DVT.      dvt ppx

## 2021-06-09 NOTE — DISCHARGE NOTE NURSING/CASE MANAGEMENT/SOCIAL WORK - PATIENT PORTAL LINK FT
You can access the FollowMyHealth Patient Portal offered by [facility] by registering at the following website: [email or web address]. By joining [facility]’s FollowMyHealth portal, you will also be able to view your health information using other applications (apps) compatible with our system.

## 2021-06-09 NOTE — PROGRESS NOTE ADULT - SUBJECTIVE AND OBJECTIVE BOX
CARDIOLOGY FOLLOW UP - Dr. Gauthier  DATE OF SERVICE: 6/9/21     CC no cp or sob       REVIEW OF SYSTEMS:  CONSTITUTIONAL: No fever, weight loss, or fatigue  RESPIRATORY: No cough, wheezing, chills or hemoptysis; No Shortness of Breath  CARDIOVASCULAR: No chest pain, palpitations, passing out, dizziness, or leg swelling  GASTROINTESTINAL: No abdominal or epigastric pain. No nausea, vomiting, or hematemesis; No diarrhea or constipation. No melena or hematochezia.  VASCULAR: + edema     PHYSICAL EXAM:  T(C): 37 (06-09-21 @ 05:33), Max: 37 (06-08-21 @ 21:12)  HR: 85 (06-09-21 @ 05:33) (85 - 91)  BP: 149/86 (06-09-21 @ 05:33) (127/69 - 149/86)  RR: 18 (06-09-21 @ 05:33) (18 - 18)  SpO2: 95% (06-09-21 @ 05:33) (95% - 98%)  Wt(kg): --  I&O's Summary      Appearance: Normal	  Cardiovascular: Normal S1 S2,RRR, No JVD, No murmurs  Respiratory: Lungs clear to auscultation	  Gastrointestinal:  Soft, Non-tender, + BS	  Extremities:  + edema       Home Medications:  amLODIPine 10 mg oral tablet: 1 tab(s) orally once a day (07 Jun 2021 09:18)      MEDICATIONS  (STANDING):  amLODIPine   Tablet 10 milliGRAM(s) Oral daily  aspirin enteric coated 81 milliGRAM(s) Oral daily  carvedilol 3.125 milliGRAM(s) Oral every 12 hours  enoxaparin Injectable 40 milliGRAM(s) SubCutaneous daily  predniSONE   Tablet 50 milliGRAM(s) Oral once      TELEMETRY:  nsr 	    ECG:  	  RADIOLOGY:   DIAGNOSTIC TESTING:  [ ] Echocardiogram:  < from: Transthoracic Echocardiogram (06.07.21 @ 15:17) >  Ejection Fraction (Teicholtz): 49 %  ------------------------------------------------------------------------  OBSERVATIONS:  Mitral Valve: Mitral annular calcification, otherwise  normal mitral valve. Mild mitral regurgitation.  Aortic Root: Normal aortic root.  Aortic Valve: Calcified trileaflet aortic valve with normal  opening.  Left Atrium: Moderately dilated left atrium.  LA volume  index = 47 cc/m2.  Left Ventricle: Mild global left ventricular systolic  dysfunction. Eccentric left ventricular hypertrophy  (dilated left ventricle with normal relative wall  thickness).  Right Heart: Normal right atrium. Normal right ventricular  size and function. Normal tricuspid valve.  Minimal  tricuspid regurgitation. Normal pulmonic valve.  Pericardium/PleuraNormal pericardium with no pericardial  effusion.  ------------------------------------------------------------------------  CONCLUSIONS:  1. Mitral annular calcification, otherwise normal mitral  valve. Mild mitral regurgitation.  2. Moderately dilated left atrium.  LA volume index = 47  cc/m2.  3. Eccentric left ventricular hypertrophy (dilated left  ventricle with normal relative wall thickness).  4. Mild global left ventricular systolic dysfunction.  5. Normal right ventricular size and function.  ------------------------------------------------------------------------  Confirmed on  6/7/2021 - 16:38:53 by Peter Streeter M.D.,  [facility], [place]  ------------------------------------------------------------------------    < end of copied text >    [ ]  Catheterization:    [ ] Stress Test:    < from: Nuclear Stress Test-Pharmacologic (06.08.21 @ 09:20) >  GATED ANALYSIS:  Rest gated wall motion analysis was performed (LVEF = 33  %;LVEDV = 207 ml.), revealing markedly reduced  LV  function.  Post-stress gated wall motion analysis was  performed (LVEF = 37 %;LVEDV = 162 ml.), revealing  moderate diffuse hypokinesis.The LV time activity curve  suggested diastolic dysfunction.  ------------------------------------------------------------------------  IMPRESSIONS:Abnormal Study  * Myocardial Perfusion SPECT results are abnormal.  * Review of raw data shows: The study is of good technical  quality.  * The left ventricle was enlarged. There is a small, mild  to moderate defect in distal inferior wall that is  reversible, suggestive of ischemia.There is a small, very  mild defect in anteroseptal wall that is reversible,  suggestive of mild ischemia. The latter may be due to  overlying soft tissue attenuation.  * Rest gated wall motion analysis was performed (LVEF = 33  %;LVEDV = 207 ml.), revealing markedly reduced  LV  function.  Post-stress gated wall motion analysis was  performed (LVEF = 37 %;LVEDV = 162 ml.), revealing  moderate diffuse hypokinesis.  ------------------------------------------------------------------------  Confirmed on  6/8/2021 - 12:36:17 by Yassine Ayala M.D.  ------------------------------------------------------------------------    < end of copied text >      OTHER: 	    LABS:	 	    Troponin T, High Sensitivity Result: 36 ng/L (06-07 @ 13:37)  Troponin T, High Sensitivity Result: 42 ng/L (06-07 @ 04:29)  Troponin T, High Sensitivity Result: 36 ng/L (06-07 @ 03:18)                          11.6   4.78  )-----------( 243      ( 09 Jun 2021 06:55 )             37.2     06-09    142  |  107  |  19  ----------------------------<  141<H>  4.3   |  23  |  0.78    Ca    9.1      09 Jun 2021 06:55  Phos  3.0     06-09  Mg     2.0     06-09              
CARDIOLOGY FOLLOW UP - Dr. Gauthier  DATE OF SERVICE: 06-08-21    CC no cp/sob     REVIEW OF SYSTEMS:   CONSTITUTIONAL: No fever, weight loss, or fatigue   RESPIRATORY:  No cough, wheezing, chills or hemoptysis; No SOB  CARDIOVASCULAR: No chest pain, palpitations, passing out, dizziness, or leg swelling   GASTROINTESTINAL: No abdominal or epigastric pain. No nausea, vomiting, or hematemesis, no diarreha, or constipation, No melena or hematochezia   VASCULAR: no edema.       PHYSICAL EXAM:  T(C): 36.8 (06-08-21 @ 06:10), Max: 37.1 (06-07-21 @ 18:54)  HR: 81 (06-08-21 @ 06:10) (81 - 98)  BP: 134/74 (06-08-21 @ 06:10) (134/74 - 149/90)  RR: 18 (06-08-21 @ 06:10) (14 - 18)  SpO2: 98% (06-08-21 @ 06:10) (98% - 100%)  Wt(kg): --  I&O's Summary      Appearance: Normal	  Cardiovascular: Normal S1 S2,RRR, No JVD, No murmurs  Respiratory: Lungs clear to auscultation	  Gastrointestinal:  Soft, Non-tender, + BS	  Extremities: Normal range of motion, No clubbing, b /l le edema       HOME MEDICATIONS:  amLODIPine 10 mg oral tablet: 1 tab(s) orally once a day (07 Jun 2021 09:18)      MEDICATIONS  (STANDING):  amLODIPine   Tablet 10 milliGRAM(s) Oral daily  aspirin enteric coated 81 milliGRAM(s) Oral daily  carvedilol 3.125 milliGRAM(s) Oral every 12 hours  enoxaparin Injectable 40 milliGRAM(s) SubCutaneous daily      TELEMETRY: nsr 	    ECG:  	  RADIOLOGY:   DIAGNOSTIC TESTING:  [ ] Echocardiogram: < from: Transthoracic Echocardiogram (06.07.21 @ 15:17) >  CONCLUSIONS:  1. Mitral annular calcification, otherwise normal mitral  valve. Mild mitral regurgitation.  2. Moderately dilated left atrium.  LA volume index = 47  cc/m2.  3. Eccentric left ventricular hypertrophy (dilated left  ventricle with normal relative wall thickness).  4. Mild global left ventricular systolic dysfunction.  5. Normal right ventricular size and function.    < end of copied text >    [ ]  Catheterization:  [ ] Stress Test:    OTHER: 	    LABS:	 	                                11.5   4.75  )-----------( 254      ( 08 Jun 2021 07:16 )             37.5     06-08    141  |  104  |  21  ----------------------------<  101<H>  3.7   |  23  |  0.88    Ca    9.1      08 Jun 2021 07:16  Phos  4.2     06-08  Mg     2.1     06-08    TPro  7.2  /  Alb  3.8  /  TBili  0.3  /  DBili  x   /  AST  15  /  ALT  <5  /  AlkPhos  298<H>  06-07

## 2021-06-14 PROBLEM — I10 ESSENTIAL (PRIMARY) HYPERTENSION: Chronic | Status: ACTIVE | Noted: 2021-06-07

## 2021-06-16 DIAGNOSIS — R07.9 CHEST PAIN, UNSPECIFIED: ICD-10-CM

## 2021-06-25 ENCOUNTER — APPOINTMENT (OUTPATIENT)
Dept: CARDIOLOGY | Facility: CLINIC | Age: 66
End: 2021-06-25

## 2021-07-13 ENCOUNTER — APPOINTMENT (OUTPATIENT)
Dept: CARDIOLOGY | Facility: HOSPITAL | Age: 66
End: 2021-07-13

## 2021-07-13 VITALS — WEIGHT: 244 LBS

## 2021-07-13 VITALS
HEART RATE: 87 BPM | BODY MASS INDEX: 39.99 KG/M2 | DIASTOLIC BLOOD PRESSURE: 94 MMHG | OXYGEN SATURATION: 97 % | SYSTOLIC BLOOD PRESSURE: 137 MMHG | HEIGHT: 65.5 IN

## 2021-07-14 NOTE — ASSESSMENT
[FreeTextEntry1] : 66Fw/ PMHx of HTN and varicose vein (L leg), who presented to the clinic for establishment of care.\par \par # HTN\par - chronic history of HTN (since age of 17).\par - Patient remains hypertensive on amlodipine 10mg and Coreg 3.125mg BID\par - Discussed regarding importance of lifestyle modification: patient is willing to try low salt diet, weight loss. \par - Her BMI is at 39, and with HTN + BMI > 35, patient may need evaluation for bariatric surgery. Weight loss with lifestyle modification discussed. f/u w/ PCP recommended.\par - Discussed with patient regarding BP medications: given LV systolic dysfunction, will increase b-blocker from 3.125 to 6.25mg BID. Patient will need ACEi/ARB for GDMT, yet she does not want too much medication changes. Will continue to address in follow up appointments.\par - f/u cards clinic in 3-4 weeks.\par \par # LV Systolic dysfunction\par - TTE (6/7/2021) showed eccentric LVH and global mild LV systolic dysfunction, mild MR and moderate LAE, possibly 2/2 chronic HTN.\par - ischemic eval w/ LHC showed no obstructing lesion (6/9/2021) \par - control BP\par - c/w b-blocker as above. Will discuss adding ACEi/ARB next visit.\par - currently has no symptoms of HF exacerbation. monitor off of diuretics for now.\par \par # primary prophylaxis\par - currently on baby dose ASA 81mg daily.\par - Patient's LHC is negative for obstructing lesion. Given ASCVD risk score of 11%, discussed with patient regarding risk and benefit of baby dose ASA. Patinet decided to continue her ASA 81mg daily for now.\par \par # Varicose vein\par - no records in chart, yet patient had procedure done (In [place]) for the varicose vein in distant past.\par - discussed compression stocking use and leg raise.\par - pt waiting for insurance approval for possible revision/stripping of varicose vein.\par \par Case discussed with Dr. Funez.\par

## 2021-07-14 NOTE — REASON FOR VISIT
[Friend] : friend [FreeTextEntry1] : Mrs. Alcazar is a 66F (immigrated from [place] in 1987) w/ PMHx of HTN (since age of 17) and varicose vein (L leg), who presented to the clinic for establishment of care.\par She has not been seen by a cardiologist before. She had her PCP in [place] (last seen 2 years ago) and now looking for a new pcp.\par \par Patient was recently hospitalized from 6/7/21 to 6/9/21. On 6/7, patient had an episode of acute chest pain after walking up a flight of stairs, which was pressure like and burning substernal pain without radiation. Patient was taken to the hospital, where she was admitted for ACS work up. TTE (6/7/2021) showed mild global LV dysfunction w/ EF of 49% and eccentric LVH, moderate LAE, mild MR. Nuc stress test (was unable to tolerate exercise per patient and was done with regadenoson) showed possible ischemia, and patient was taken to [facility], which showed mild atherosclerosis with no obstructing lesions. Patient was discharged afterwards.\par \par Since discharge, patient remained symptom free. Chest pain did not recur. She denies SOB, headache, vision change, palpitation, abdominal pain. Her R radial site is well healed, no signs of local infection or hematoma.\par \par Patient's BP today is 137/94 in office. She states that she has been compliant with her medications and her BP when measured at outpatient also runs 130s-140s. She snores yet denies daytime somnolence, narcolepsy or witnessed apneic event.\par \par Her varicose vein has been for a long time, and she as LE edema madi on left leg, which worsens during the day with ambulation, improves with leg raise/compression stocking. Patient had ligation procedure done for the varicose vein, yet she is pending insurance for possible stripping procedure of varicose vein.\par \par Medications\par ASA 81mg daily\par Amlodpine 10mg daily\par Coreg 3.125mg BID\par \par Allergies\par Allergic to "MRI Dye" - lips/tongue swelling.\par \par FHx:\par HTN in mother/sisters\par no SCD in family members.\par no MI in direct family members\par \par SHx:\par former smoker: quit 34 years ago. (2py)\par No EtOH use.\par No rec drug use.\par Lives with  and 2 sons. no pets. 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

## 2021-07-14 NOTE — REVIEW OF SYSTEMS
[Lower Ext Edema] : lower extremity edema [Negative] : Heme/Lymph [FreeTextEntry5] : varicose vein with LE edema on left leg.

## 2021-08-04 ENCOUNTER — OUTPATIENT (OUTPATIENT)
Dept: OUTPATIENT SERVICES | Facility: HOSPITAL | Age: 66
LOS: 1 days | End: 2021-08-04

## 2021-08-04 ENCOUNTER — RESULT REVIEW (OUTPATIENT)
Age: 66
End: 2021-08-04

## 2021-08-04 ENCOUNTER — MED ADMIN CHARGE (OUTPATIENT)
Age: 66
End: 2021-08-04

## 2021-08-04 ENCOUNTER — APPOINTMENT (OUTPATIENT)
Dept: INTERNAL MEDICINE | Facility: CLINIC | Age: 66
End: 2021-08-04
Payer: SELF-PAY

## 2021-08-04 VITALS
WEIGHT: 244 LBS | SYSTOLIC BLOOD PRESSURE: 152 MMHG | BODY MASS INDEX: 40.17 KG/M2 | HEIGHT: 65.5 IN | DIASTOLIC BLOOD PRESSURE: 86 MMHG | HEART RATE: 75 BPM | OXYGEN SATURATION: 98 %

## 2021-08-04 VITALS — TEMPERATURE: 95.1 F

## 2021-08-04 DIAGNOSIS — Z98.890 OTHER SPECIFIED POSTPROCEDURAL STATES: Chronic | ICD-10-CM

## 2021-08-04 LAB
A1C WITH ESTIMATED AVERAGE GLUCOSE RESULT: 5.3 % — SIGNIFICANT CHANGE UP (ref 4–5.6)
ALBUMIN SERPL ELPH-MCNC: 4.2 G/DL — SIGNIFICANT CHANGE UP (ref 3.3–5)
ALP SERPL-CCNC: 298 U/L — HIGH (ref 40–120)
ALT FLD-CCNC: 6 U/L — SIGNIFICANT CHANGE UP (ref 4–33)
ANION GAP SERPL CALC-SCNC: 13 MMOL/L — SIGNIFICANT CHANGE UP (ref 7–14)
AST SERPL-CCNC: 15 U/L — SIGNIFICANT CHANGE UP (ref 4–32)
BASOPHILS # BLD AUTO: 0.02 K/UL — SIGNIFICANT CHANGE UP (ref 0–0.2)
BASOPHILS NFR BLD AUTO: 0.3 % — SIGNIFICANT CHANGE UP (ref 0–2)
BILIRUB SERPL-MCNC: 0.3 MG/DL — SIGNIFICANT CHANGE UP (ref 0.2–1.2)
BUN SERPL-MCNC: 16 MG/DL — SIGNIFICANT CHANGE UP (ref 7–23)
CALCIUM SERPL-MCNC: 9.4 MG/DL — SIGNIFICANT CHANGE UP (ref 8.4–10.5)
CHLORIDE SERPL-SCNC: 106 MMOL/L — SIGNIFICANT CHANGE UP (ref 98–107)
CO2 SERPL-SCNC: 23 MMOL/L — SIGNIFICANT CHANGE UP (ref 22–31)
CREAT SERPL-MCNC: 0.97 MG/DL — SIGNIFICANT CHANGE UP (ref 0.5–1.3)
EOSINOPHIL # BLD AUTO: 0.07 K/UL — SIGNIFICANT CHANGE UP (ref 0–0.5)
EOSINOPHIL NFR BLD AUTO: 1.2 % — SIGNIFICANT CHANGE UP (ref 0–6)
ESTIMATED AVERAGE GLUCOSE: 105 — SIGNIFICANT CHANGE UP
GLUCOSE SERPL-MCNC: 110 MG/DL — HIGH (ref 70–99)
HCT VFR BLD CALC: 37.3 % — SIGNIFICANT CHANGE UP (ref 34.5–45)
HGB BLD-MCNC: 11.4 G/DL — LOW (ref 11.5–15.5)
IANC: 2.99 K/UL — SIGNIFICANT CHANGE UP (ref 1.5–8.5)
IMM GRANULOCYTES NFR BLD AUTO: 0.3 % — SIGNIFICANT CHANGE UP (ref 0–1.5)
LYMPHOCYTES # BLD AUTO: 2.26 K/UL — SIGNIFICANT CHANGE UP (ref 1–3.3)
LYMPHOCYTES # BLD AUTO: 39.1 % — SIGNIFICANT CHANGE UP (ref 13–44)
MCHC RBC-ENTMCNC: 26.3 PG — LOW (ref 27–34)
MCHC RBC-ENTMCNC: 30.6 GM/DL — LOW (ref 32–36)
MCV RBC AUTO: 86.1 FL — SIGNIFICANT CHANGE UP (ref 80–100)
MONOCYTES # BLD AUTO: 0.42 K/UL — SIGNIFICANT CHANGE UP (ref 0–0.9)
MONOCYTES NFR BLD AUTO: 7.3 % — SIGNIFICANT CHANGE UP (ref 2–14)
NEUTROPHILS # BLD AUTO: 2.99 K/UL — SIGNIFICANT CHANGE UP (ref 1.8–7.4)
NEUTROPHILS NFR BLD AUTO: 51.8 % — SIGNIFICANT CHANGE UP (ref 43–77)
NRBC # BLD: 0 /100 WBCS — SIGNIFICANT CHANGE UP
NRBC # FLD: 0 K/UL — SIGNIFICANT CHANGE UP
PLATELET # BLD AUTO: 277 K/UL — SIGNIFICANT CHANGE UP (ref 150–400)
POTASSIUM SERPL-MCNC: 3.7 MMOL/L — SIGNIFICANT CHANGE UP (ref 3.5–5.3)
POTASSIUM SERPL-SCNC: 3.7 MMOL/L — SIGNIFICANT CHANGE UP (ref 3.5–5.3)
PROT SERPL-MCNC: 7.7 G/DL — SIGNIFICANT CHANGE UP (ref 6–8.3)
RBC # BLD: 4.33 M/UL — SIGNIFICANT CHANGE UP (ref 3.8–5.2)
RBC # FLD: 13.7 % — SIGNIFICANT CHANGE UP (ref 10.3–14.5)
SODIUM SERPL-SCNC: 142 MMOL/L — SIGNIFICANT CHANGE UP (ref 135–145)
WBC # BLD: 5.78 K/UL — SIGNIFICANT CHANGE UP (ref 3.8–10.5)
WBC # FLD AUTO: 5.78 K/UL — SIGNIFICANT CHANGE UP (ref 3.8–10.5)

## 2021-08-04 PROCEDURE — ZZZZZ: CPT

## 2021-08-04 NOTE — HEALTH RISK ASSESSMENT
[0-4] : 0-4 [No] : No [No falls in past year] : Patient reported no falls in the past year [0] : 2) Feeling down, depressed, or hopeless: Not at all (0) [Patient reported mammogram was normal] : Patient reported mammogram was normal [Patient reported colonoscopy was normal] : Patient reported colonoscopy was normal [HIV test declined] : HIV test declined [Hepatitis C test declined] : Hepatitis C test declined [With Significant Other] : lives with significant other [Employed] : employed [] :  [# Of Children ___] : has [unfilled] children [Sexually Active] : sexually active [Feels Safe at Home] : Feels safe at home [Designated Healthcare Proxy] : Designated healthcare proxy [Name: ___] : Health Care Proxy's Name: [unfilled]  [Relationship: ___] : Relationship: [unfilled] [YearQuit] : 1989 [[id number]] : 0 [High Risk Behavior] : no high risk behavior [Reports changes in hearing] : Reports no changes in hearing [Reports changes in vision] : Reports no changes in vision [Reports normal functional visual acuity (ie: able to read med bottle)] : Reports poor functional visual acuity.  

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
## 2021-08-06 ENCOUNTER — NON-APPOINTMENT (OUTPATIENT)
Age: 66
End: 2021-08-06

## 2021-08-09 DIAGNOSIS — Z23 ENCOUNTER FOR IMMUNIZATION: ICD-10-CM

## 2021-08-09 DIAGNOSIS — I10 ESSENTIAL (PRIMARY) HYPERTENSION: ICD-10-CM

## 2021-08-09 DIAGNOSIS — I50.22 CHRONIC SYSTOLIC (CONGESTIVE) HEART FAILURE: ICD-10-CM

## 2021-08-10 ENCOUNTER — APPOINTMENT (OUTPATIENT)
Dept: CARDIOLOGY | Facility: HOSPITAL | Age: 66
End: 2021-08-10

## 2021-08-10 NOTE — REASON FOR VISIT
[FreeTextEntry1] : Mrs. Alcazar is a 66F (immigrated from [place] in 1988) w/ PMHx of HTN (since age of 17) and varicose vein (L leg), who presented to the clinic for establishment of care.\par \par Since discharge, patient remained symptom free. Chest pain did not recur. She denies SOB, headache, vision change, palpitation, abdominal pain. Her R radial site is well healed, no signs of local infection or hematoma.\par \par Patient's BP today is *** in office. She states that she has been compliant with her medications and her BP when measured at outpatient also runs 130s-140s. She snores yet denies daytime somnolence, narcolepsy or witnessed apneic event.\par She has been on amlodipine 10mg daily and coreg 3.125mg BID, which was increased to 6.25mg BID on Jul 13 2021. Patient had subsequent visit to her PCP where she remained hypertensive and was started on lisinopril 5mg daily on Aug 4. \par \par \par Of note, Patient was hospitalized from 6/7/21 to 6/9/21. On 6/7, patient had an episode of acute chest pain after walking up a flight of stairs, which was pressure like and burning substernal pain without radiation. Patient was taken to the hospital, where she was admitted for ACS work up. TTE (6/7/2021) showed mild global LV dysfunction w/ EF of 49% and eccentric LVH, moderate LAE, mild MR. Nuc stress test (was unable to tolerate exercise per patient and was done with regadenoson) showed possible ischemia, and patient was taken to [facility], which showed mild atherosclerosis with no obstructing lesions. Patient was discharged afterwards.\par \par

## 2021-08-24 ENCOUNTER — NON-APPOINTMENT (OUTPATIENT)
Age: 66
End: 2021-08-24

## 2021-08-24 ENCOUNTER — APPOINTMENT (OUTPATIENT)
Dept: CARDIOLOGY | Facility: HOSPITAL | Age: 66
End: 2021-08-24

## 2021-08-24 VITALS
SYSTOLIC BLOOD PRESSURE: 143 MMHG | TEMPERATURE: 97.1 F | WEIGHT: 243 LBS | HEART RATE: 63 BPM | RESPIRATION RATE: 16 BRPM | OXYGEN SATURATION: 98 % | BODY MASS INDEX: 40.48 KG/M2 | DIASTOLIC BLOOD PRESSURE: 84 MMHG | HEIGHT: 65 IN

## 2021-08-24 NOTE — ASSESSMENT
[FreeTextEntry1] : 66F w/ PMHx of HTN and varicose vein (L leg), who presented to the clinic for follow up.\par \par # HTN\par - chronic history of HTN (since age of 17).\par - Patient remains hypertensive on amlodipine 10mg daily, lisinopril 5mg daily, and Coreg 12.5mg BID\par - Discussed regarding importance of lifestyle modification: patient is willing to try low salt diet, weight loss. \par - Her BMI is at 39, and with HTN + BMI > 35, patient may need evaluation for bariatric surgery. Weight loss with lifestyle modification discussed. f/u w/ PCP recommended. \par - will increase lisinopril to 10mg daily and Coreg to 25mg BID.\par - f/u cards clinic in 4-6 weeks.\par \par # LV Systolic dysfunction\par - TTE (6/7/2021) showed eccentric LVH and global mild LV systolic dysfunction, mild MR and moderate LAE, possibly 2/2 chronic HTN.\par - ischemic eval w/ LHC showed no obstructing lesion (6/9/2021) \par - control BP\par - c/w b-blocker and ACEi as above.\par - currently has no symptoms of HF exacerbation. monitor off of diuretics for now.\par \par # primary prophylaxis\par - currently on baby dose ASA 81mg daily.\par - Patient's LHC is negative for obstructing lesion. Given ASCVD risk score of 11%, discussed with patient regarding risk and benefit of baby dose ASA. Pt decided to continue her ASA 81mg daily for now.\par \par # Varicose vein\par - no records in chart, yet patient had procedure done (In [place]) for the varicose vein in distant past.\par - discussed compression stocking use and leg raise.\par - pt waiting for insurance approval for possible revision/stripping of varicose vein. She is planning on following up with her PCP.\par \par Case discussed with Dr. Funez.\par

## 2021-08-24 NOTE — REASON FOR VISIT
[FreeTextEntry1] :  Mrs. Alcazar is a 66F (immigrated from [place] in 1988) w/ PMHx of HTN (since age of 17) and varicose vein (L leg), who presented to the clinic for establishment of care.  \par \par Since discharge, patient remained symptom free. Chest pain did not recur. She denies SOB, headache, vision change, palpitation, abdominal pain. Her R radial site is well healed, no signs of local infection or hematoma.   Patient has increased in exercise (walking a lot), lost 1lb since last visit. Trying to eat healthy - more fish and vegetables. trying her best to keep low Na diet. \par \par Patient's BP today is 143/84 in office. She checks her BP at home from time to time, and it has been ranging SBP 130s-140s. She snores yet denies daytime somnolence, narcolepsy or witnessed apneic event.  She has been on amlodipine 10mg daily and coreg 3.125mg BID, which was increased to 6.25mg BID on Jul 13 2021. Patient had subsequent visit to her PCP where she remained hypertensive and was started on lisinopril 5mg daily as well as increasing coreg to 12.5mg BID on Aug 4.    \par \par Of note, Patient was hospitalized from 6/7/21 to 6/9/21. On 6/7, patient had an episode of acute chest pain after walking up a flight of stairs, which was pressure like and burning substernal pain without radiation. Patient was taken to the hospital, where she was admitted for ACS work up. TTE (6/7/2021) showed mild global LV dysfunction w/ EF of 49% and eccentric LVH, moderate LAE, mild MR. Nuc stress test (was unable to tolerate exercise per patient and was done with regadenoson) showed possible ischemia, and patient was taken to [facility], which showed mild atherosclerosis with no obstructing lesions. Patient was discharged afterwards.

## 2021-08-24 NOTE — PHYSICAL EXAM
[Well Developed] : well developed [Well Nourished] : well nourished [No Acute Distress] : no acute distress [Normal Conjunctiva] : normal conjunctiva [Normal Venous Pressure] : normal venous pressure [No Carotid Bruit] : no carotid bruit [Normal S1, S2] : normal S1, S2 [No Murmur] : no murmur [No Rub] : no rub [No Gallop] : no gallop [Clear Lung Fields] : clear lung fields [No Respiratory Distress] : no respiratory distress  [Good Air Entry] : good air entry [Soft] : abdomen soft [Non Tender] : non-tender [No Masses/organomegaly] : no masses/organomegaly [Normal Bowel Sounds] : normal bowel sounds [Normal Gait] : normal gait [No Edema] : no edema [No Cyanosis] : no cyanosis [No Clubbing] : no clubbing [No Varicosities] : no varicosities [No Skin Lesions] : no skin lesions [No Rash] : no rash [Moves all extremities] : moves all extremities [No Focal Deficits] : no focal deficits [Normal Speech] : normal speech [Alert and Oriented] : alert and oriented [Normal memory] : normal memory

## 2021-10-05 ENCOUNTER — APPOINTMENT (OUTPATIENT)
Dept: CARDIOLOGY | Facility: HOSPITAL | Age: 66
End: 2021-10-05

## 2021-10-26 ENCOUNTER — APPOINTMENT (OUTPATIENT)
Dept: CARDIOLOGY | Facility: HOSPITAL | Age: 66
End: 2021-10-26

## 2021-10-26 VITALS
HEART RATE: 77 BPM | HEIGHT: 65 IN | BODY MASS INDEX: 39.99 KG/M2 | SYSTOLIC BLOOD PRESSURE: 129 MMHG | RESPIRATION RATE: 16 BRPM | DIASTOLIC BLOOD PRESSURE: 82 MMHG | WEIGHT: 240 LBS | OXYGEN SATURATION: 99 % | TEMPERATURE: 98 F

## 2021-10-26 NOTE — HISTORY OF PRESENT ILLNESS
[FreeTextEntry1] : Mrs. Alcazar is a 66F (immigrated from [place] in 1988) w/ PMHx of HTN (since age of 17), varicose vein (L leg), HFmrEF (EF 49%), who presented to the clinic for follow up.\par \par Patient remains symptom free. She denies SOB, headache, vision change, palpitation, abdominal pain. Her R radial site is well healed, no signs of local infection or hematoma. Patient is maintaining exercise with walking more than 3 blocks and walking up 3 flight of stairs without any exertional symptoms. She has lost 3lbs since last visit. \par She continues to try eat healthy yet having trouble keeping Na down. However, she also reports early satiety recently. She feels hunger, yet feels full after 2 spoons or so. However, she can eat sweet food more than that. Trying to eat healthy - more fish and vegetables. trying her best to keep low Na diet. \par \par Patient's BP today is 129/82 in office. She checks her BP at home from time to time, and it has been ranging SBP 130s-140s. She snores yet denies daytime somnolence, narcolepsy or witnessed apneic event. \par \par Prior Cardiac Testing:\par TTE (6/7/2021) showed mild global LV dysfunction w/ EF of 49% and eccentric LVH, moderate LAE, mild MR. \par Nuc stress test (was unable to tolerate exercise per patient and was done with regadenoson) showed possible ischemia, and patient was taken to [facility], which showed mild atherosclerosis with no obstructing lesions.

## 2021-10-26 NOTE — ASSESSMENT
[FreeTextEntry1] : 66F w/ PMHx of HTN and varicose vein (L leg), who presented to the clinic for follow up.\par \par # HTN\par - chronic history of HTN (since age of 17).\par - Patient became normotensive on amlodipine 10mg daily, lisinopril 10mg daily, and Coreg 25mg BID\par - Ongoing emphasis on lifestyle modification: patient is willing to try low salt diet, weight loss. \par - Her BMI is at 39, and with HTN + BMI > 35, patient may need evaluation for bariatric surgery. Weight loss with lifestyle modification discussed: patient losing her weight past two visits. f/u w/ PCP recommended. \par - c/w current regimen.\par - f/u cards clinic in 4-6 weeks.\par \par # LV Systolic dysfunction\par - TTE (6/7/2021) showed eccentric LVH and global mild LV systolic dysfunction, mild MR and moderate LAE, possibly 2/2 chronic HTN.\par - ischemic eval w/ LHC showed no obstructing lesion (6/9/2021) \par - control BP\par - c/w CCB, b-blocker and ACEi as above.\par - currently has no symptoms of HF exacerbation. monitor off of diuretics for now.\par - Given that ania's HTN is more controlled, if ania's BP remains controlled for next month or two, would repeat TTE to assess recovery of her systolic function.\par \par # primary prophylaxis\par - currently on baby dose ASA 81mg daily.\par - Patient's LHC is negative for obstructing lesion. Given ASCVD risk score of 11%, discussed with patient regarding risk and benefit of baby dose ASA. Pt decided to continue her ASA 81mg daily for now.\par \par # Varicose vein\par - no records in chart, yet patient had procedure done (In [place]) for the varicose vein in distant past.\par - discussed compression stocking use and leg raise.\par - pt waiting for insurance approval for possible revision/stripping of varicose vein. She is planning on following up with her PCP.\par \par Case discussed with Dr. Salas

## 2021-10-26 NOTE — PHYSICAL EXAM
[Well Developed] : well developed [Well Nourished] : well nourished [No Acute Distress] : no acute distress [Obese] : obese [Normal Conjunctiva] : normal conjunctiva [Normal Venous Pressure] : normal venous pressure [No Carotid Bruit] : no carotid bruit [Normal S1, S2] : normal S1, S2 [No Murmur] : no murmur [No Rub] : no rub [No Gallop] : no gallop [Clear Lung Fields] : clear lung fields [Good Air Entry] : good air entry [No Respiratory Distress] : no respiratory distress  [Soft] : abdomen soft [Non Tender] : non-tender [No Masses/organomegaly] : no masses/organomegaly [Normal Bowel Sounds] : normal bowel sounds [Normal Gait] : normal gait [No Cyanosis] : no cyanosis [No Clubbing] : no clubbing [No Varicosities] : no varicosities [No Rash] : no rash [No Skin Lesions] : no skin lesions [Moves all extremities] : moves all extremities [No Focal Deficits] : no focal deficits [Normal Speech] : normal speech [Alert and Oriented] : alert and oriented [Normal memory] : normal memory [de-identified] : Leg edema worse on LLE. Varicose vein. [de-identified] : Leg edema worse on LLE. Varicose vein.

## 2021-11-19 ENCOUNTER — RESULT REVIEW (OUTPATIENT)
Age: 66
End: 2021-11-19

## 2021-11-19 ENCOUNTER — MED ADMIN CHARGE (OUTPATIENT)
Age: 66
End: 2021-11-19

## 2021-11-19 ENCOUNTER — APPOINTMENT (OUTPATIENT)
Dept: INTERNAL MEDICINE | Facility: CLINIC | Age: 66
End: 2021-11-19
Payer: COMMERCIAL

## 2021-11-19 ENCOUNTER — OUTPATIENT (OUTPATIENT)
Dept: OUTPATIENT SERVICES | Facility: HOSPITAL | Age: 66
LOS: 1 days | End: 2021-11-19

## 2021-11-19 VITALS
BODY MASS INDEX: 39.38 KG/M2 | HEART RATE: 68 BPM | OXYGEN SATURATION: 98 % | TEMPERATURE: 96.5 F | HEIGHT: 65 IN | WEIGHT: 236.38 LBS

## 2021-11-19 DIAGNOSIS — Z98.890 OTHER SPECIFIED POSTPROCEDURAL STATES: Chronic | ICD-10-CM

## 2021-11-19 DIAGNOSIS — I83.93 ASYMPTOMATIC VARICOSE VEINS OF BILATERAL LOWER EXTREMITIES: ICD-10-CM

## 2021-11-19 LAB
ALBUMIN SERPL ELPH-MCNC: 4.2 G/DL — SIGNIFICANT CHANGE UP (ref 3.3–5)
ALP SERPL-CCNC: 309 U/L — HIGH (ref 40–120)
ALT FLD-CCNC: 5 U/L — SIGNIFICANT CHANGE UP (ref 4–33)
ANION GAP SERPL CALC-SCNC: 12 MMOL/L — SIGNIFICANT CHANGE UP (ref 7–14)
AST SERPL-CCNC: 13 U/L — SIGNIFICANT CHANGE UP (ref 4–32)
BASOPHILS # BLD AUTO: 0.01 K/UL — SIGNIFICANT CHANGE UP (ref 0–0.2)
BASOPHILS NFR BLD AUTO: 0.2 % — SIGNIFICANT CHANGE UP (ref 0–2)
BILIRUB SERPL-MCNC: 0.3 MG/DL — SIGNIFICANT CHANGE UP (ref 0.2–1.2)
BUN SERPL-MCNC: 15 MG/DL — SIGNIFICANT CHANGE UP (ref 7–23)
CALCIUM SERPL-MCNC: 9.3 MG/DL — SIGNIFICANT CHANGE UP (ref 8.4–10.5)
CHLORIDE SERPL-SCNC: 106 MMOL/L — SIGNIFICANT CHANGE UP (ref 98–107)
CHOLEST SERPL-MCNC: 205 MG/DL — HIGH
CO2 SERPL-SCNC: 25 MMOL/L — SIGNIFICANT CHANGE UP (ref 22–31)
CREAT SERPL-MCNC: 1.03 MG/DL — SIGNIFICANT CHANGE UP (ref 0.5–1.3)
EOSINOPHIL # BLD AUTO: 0.04 K/UL — SIGNIFICANT CHANGE UP (ref 0–0.5)
EOSINOPHIL NFR BLD AUTO: 0.8 % — SIGNIFICANT CHANGE UP (ref 0–6)
GGT SERPL-CCNC: 22 U/L — SIGNIFICANT CHANGE UP (ref 5–36)
GLUCOSE SERPL-MCNC: 106 MG/DL — HIGH (ref 70–99)
HCT VFR BLD CALC: 39.2 % — SIGNIFICANT CHANGE UP (ref 34.5–45)
HDLC SERPL-MCNC: 65 MG/DL — SIGNIFICANT CHANGE UP
HGB BLD-MCNC: 11.7 G/DL — SIGNIFICANT CHANGE UP (ref 11.5–15.5)
IANC: 3.26 K/UL — SIGNIFICANT CHANGE UP (ref 1.5–8.5)
IMM GRANULOCYTES NFR BLD AUTO: 0.4 % — SIGNIFICANT CHANGE UP (ref 0–1.5)
LIPID PNL WITH DIRECT LDL SERPL: 121 MG/DL — HIGH
LYMPHOCYTES # BLD AUTO: 1.65 K/UL — SIGNIFICANT CHANGE UP (ref 1–3.3)
LYMPHOCYTES # BLD AUTO: 31.2 % — SIGNIFICANT CHANGE UP (ref 13–44)
MCHC RBC-ENTMCNC: 26.7 PG — LOW (ref 27–34)
MCHC RBC-ENTMCNC: 29.8 GM/DL — LOW (ref 32–36)
MCV RBC AUTO: 89.3 FL — SIGNIFICANT CHANGE UP (ref 80–100)
MONOCYTES # BLD AUTO: 0.31 K/UL — SIGNIFICANT CHANGE UP (ref 0–0.9)
MONOCYTES NFR BLD AUTO: 5.9 % — SIGNIFICANT CHANGE UP (ref 2–14)
NEUTROPHILS # BLD AUTO: 3.26 K/UL — SIGNIFICANT CHANGE UP (ref 1.8–7.4)
NEUTROPHILS NFR BLD AUTO: 61.5 % — SIGNIFICANT CHANGE UP (ref 43–77)
NON HDL CHOLESTEROL: 140 MG/DL — HIGH
NRBC # BLD: 0 /100 WBCS — SIGNIFICANT CHANGE UP
NRBC # FLD: 0 K/UL — SIGNIFICANT CHANGE UP
PLATELET # BLD AUTO: 252 K/UL — SIGNIFICANT CHANGE UP (ref 150–400)
POTASSIUM SERPL-MCNC: 3.9 MMOL/L — SIGNIFICANT CHANGE UP (ref 3.5–5.3)
POTASSIUM SERPL-SCNC: 3.9 MMOL/L — SIGNIFICANT CHANGE UP (ref 3.5–5.3)
PROT SERPL-MCNC: 7.7 G/DL — SIGNIFICANT CHANGE UP (ref 6–8.3)
RBC # BLD: 4.39 M/UL — SIGNIFICANT CHANGE UP (ref 3.8–5.2)
RBC # FLD: 14.3 % — SIGNIFICANT CHANGE UP (ref 10.3–14.5)
SODIUM SERPL-SCNC: 143 MMOL/L — SIGNIFICANT CHANGE UP (ref 135–145)
TRIGL SERPL-MCNC: 95 MG/DL — SIGNIFICANT CHANGE UP
WBC # BLD: 5.29 K/UL — SIGNIFICANT CHANGE UP (ref 3.8–10.5)
WBC # FLD AUTO: 5.29 K/UL — SIGNIFICANT CHANGE UP (ref 3.8–10.5)

## 2021-11-19 PROCEDURE — ZZZZZ: CPT

## 2021-11-22 ENCOUNTER — NON-APPOINTMENT (OUTPATIENT)
Age: 66
End: 2021-11-22
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
## 2021-11-23 NOTE — PHYSICAL EXAM
[No Acute Distress] : no acute distress [Well-Appearing] : well-appearing [No JVD] : no jugular venous distention [No Respiratory Distress] : no respiratory distress  [Clear to Auscultation] : lungs were clear to auscultation bilaterally [Normal Rate] : normal rate  [Regular Rhythm] : with a regular rhythm [No Murmur] : no murmur heard [Soft] : abdomen soft [Non Tender] : non-tender [Non-distended] : non-distended [No CVA Tenderness] : no CVA  tenderness [No Joint Swelling] : no joint swelling [de-identified] : LLE varicose veins, no RLE edema

## 2021-11-23 NOTE — REVIEW OF SYSTEMS
[Negative] : Musculoskeletal [Chest Pain] : no chest pain [Palpitations] : no palpitations [Leg Claudication] : no leg claudication [Orthopnea] : no orthopnea [Shortness Of Breath] : no shortness of breath [Wheezing] : no wheezing [Cough] : no cough [Dyspnea on Exertion] : no dyspnea on exertion [FreeTextEntry5] : LLE varicose veins
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
## 2021-11-24 DIAGNOSIS — R74.8 ABNORMAL LEVELS OF OTHER SERUM ENZYMES: ICD-10-CM

## 2021-11-24 DIAGNOSIS — I83.93 ASYMPTOMATIC VARICOSE VEINS OF BILATERAL LOWER EXTREMITIES: ICD-10-CM

## 2021-11-24 DIAGNOSIS — I50.22 CHRONIC SYSTOLIC (CONGESTIVE) HEART FAILURE: ICD-10-CM

## 2021-11-24 DIAGNOSIS — Z23 ENCOUNTER FOR IMMUNIZATION: ICD-10-CM

## 2021-11-24 DIAGNOSIS — I10 ESSENTIAL (PRIMARY) HYPERTENSION: ICD-10-CM

## 2021-12-03 ENCOUNTER — APPOINTMENT (OUTPATIENT)
Dept: CT IMAGING | Facility: CLINIC | Age: 66
End: 2021-12-03

## 2022-01-25 ENCOUNTER — OUTPATIENT (OUTPATIENT)
Dept: OUTPATIENT SERVICES | Facility: HOSPITAL | Age: 67
LOS: 1 days | End: 2022-01-25

## 2022-01-25 ENCOUNTER — APPOINTMENT (OUTPATIENT)
Dept: INTERNAL MEDICINE | Facility: CLINIC | Age: 67
End: 2022-01-25
Payer: COMMERCIAL

## 2022-01-25 VITALS
SYSTOLIC BLOOD PRESSURE: 131 MMHG | DIASTOLIC BLOOD PRESSURE: 79 MMHG | HEIGHT: 65 IN | HEART RATE: 78 BPM | BODY MASS INDEX: 40.15 KG/M2 | WEIGHT: 241 LBS | OXYGEN SATURATION: 98 %

## 2022-01-25 DIAGNOSIS — L91.0 HYPERTROPHIC SCAR: ICD-10-CM

## 2022-01-25 DIAGNOSIS — Z98.890 OTHER SPECIFIED POSTPROCEDURAL STATES: Chronic | ICD-10-CM

## 2022-01-25 PROCEDURE — ZZZZZ: CPT

## 2022-01-28 DIAGNOSIS — R74.8 ABNORMAL LEVELS OF OTHER SERUM ENZYMES: ICD-10-CM

## 2022-01-28 DIAGNOSIS — I10 ESSENTIAL (PRIMARY) HYPERTENSION: ICD-10-CM

## 2022-01-28 DIAGNOSIS — L91.0 HYPERTROPHIC SCAR: ICD-10-CM

## 2022-01-28 NOTE — PHYSICAL EXAM
[Normal] : normal gait, coordination grossly intact, no focal deficits and deep tendon reflexes were 2+ and symmetric [de-identified] : Large Keloid formation over R side of face to the ear
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
## 2022-01-28 NOTE — COUNSELING
[Benefits of weight loss discussed] : Benefits of weight loss discussed [Encouraged to increase physical activity] : Encouraged to increase physical activity [Needs reinforcement, provided] : Patient needs reinforcement on understanding of disease, goals and obesity follow-up plan; reinforcement was provided [FreeTextEntry3] : Stop drinking soda [FreeTextEntry4] : 16
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
## 2022-02-01 ENCOUNTER — APPOINTMENT (OUTPATIENT)
Dept: CARDIOLOGY | Facility: HOSPITAL | Age: 67
End: 2022-02-01

## 2022-02-08 ENCOUNTER — OUTPATIENT (OUTPATIENT)
Dept: OUTPATIENT SERVICES | Facility: HOSPITAL | Age: 67
LOS: 1 days | End: 2022-02-08

## 2022-02-08 ENCOUNTER — APPOINTMENT (OUTPATIENT)
Dept: INTERNAL MEDICINE | Facility: CLINIC | Age: 67
End: 2022-02-08
Payer: COMMERCIAL

## 2022-02-08 VITALS — HEART RATE: 94 BPM | HEIGHT: 65 IN | SYSTOLIC BLOOD PRESSURE: 156 MMHG | DIASTOLIC BLOOD PRESSURE: 100 MMHG

## 2022-02-08 DIAGNOSIS — E78.5 HYPERLIPIDEMIA, UNSPECIFIED: ICD-10-CM

## 2022-02-08 DIAGNOSIS — Z98.890 OTHER SPECIFIED POSTPROCEDURAL STATES: Chronic | ICD-10-CM

## 2022-02-08 PROCEDURE — ZZZZZ: CPT

## 2022-02-08 NOTE — REVIEW OF SYSTEMS
[Negative] : Respiratory [Fever] : no fever [Chills] : no chills [Night Sweats] : no night sweats [Abdominal Pain] : no abdominal pain [Nausea] : no nausea [Constipation] : no constipation [Vomiting] : no vomiting [Joint Pain] : no joint pain [Joint Stiffness] : no joint stiffness [Joint Swelling] : no joint swelling [Muscle Pain] : no muscle pain [Skin Rash] : no skin rash

## 2022-02-08 NOTE — HISTORY OF PRESENT ILLNESS
[Home] : at home, [unfilled] , at the time of the visit. [Medical Office: ([facility])___] : at the medical office located in  [Verbal consent obtained from patient] : the patient, [unfilled] [FreeTextEntry1] : 2 week follow up for monitoring of medication side effect [de-identified] : 66F w/hx of HTN, HLD, obesity, HFpEF coming in for 2 week telephonic follow up visit to assess tolerance of atorvastatin. Pt waws started on statin for elevated lipid profile and 10 year ASCVD risk. Pt tolerating medication without any side effects including myalgias, bone pains, abd pain, nausea, vomiting, or diarrhea.

## 2022-02-08 NOTE — PHYSICAL EXAM
[Normal Voice/Communication] : normal voice/communication [No Respiratory Distress] : no respiratory distress  [de-identified] : speaking in full sentences without difficulty

## 2022-02-22 ENCOUNTER — APPOINTMENT (OUTPATIENT)
Dept: CARDIOLOGY | Facility: HOSPITAL | Age: 67
End: 2022-02-22

## 2022-02-22 ENCOUNTER — NON-APPOINTMENT (OUTPATIENT)
Age: 67
End: 2022-02-22

## 2022-02-22 VITALS
HEART RATE: 72 BPM | HEIGHT: 65 IN | BODY MASS INDEX: 39.99 KG/M2 | SYSTOLIC BLOOD PRESSURE: 154 MMHG | OXYGEN SATURATION: 99 % | WEIGHT: 240 LBS | DIASTOLIC BLOOD PRESSURE: 98 MMHG | TEMPERATURE: 96.2 F

## 2022-02-22 VITALS — DIASTOLIC BLOOD PRESSURE: 77 MMHG | SYSTOLIC BLOOD PRESSURE: 137 MMHG

## 2022-02-22 NOTE — PHYSICAL EXAM
[Well Developed] : well developed [Well Nourished] : well nourished [No Acute Distress] : no acute distress [Obese] : obese [Normal Conjunctiva] : normal conjunctiva [Normal Venous Pressure] : normal venous pressure [No Carotid Bruit] : no carotid bruit [Normal S1, S2] : normal S1, S2 [No Murmur] : no murmur [No Rub] : no rub [No Gallop] : no gallop [Clear Lung Fields] : clear lung fields [Good Air Entry] : good air entry [No Respiratory Distress] : no respiratory distress  [Soft] : abdomen soft [Non Tender] : non-tender [No Masses/organomegaly] : no masses/organomegaly [Normal Bowel Sounds] : normal bowel sounds [Normal Gait] : normal gait [No Cyanosis] : no cyanosis [No Clubbing] : no clubbing [No Varicosities] : no varicosities [No Rash] : no rash [No Skin Lesions] : no skin lesions [Moves all extremities] : moves all extremities [No Focal Deficits] : no focal deficits [Normal Speech] : normal speech [Alert and Oriented] : alert and oriented [Normal memory] : normal memory [de-identified] : Leg edema worse on LLE. Varicose vein.

## 2022-02-22 NOTE — HISTORY OF PRESENT ILLNESS
[FreeTextEntry1] : Mrs. Alcazar is a 66F (immigrated from [place] in 1987) w/ PMHx of HTN (since age of 17), varicose vein (L leg), HFmrEF (EF 49%), who presented to the clinic for follow up.\par \par Patient remains symptom free. She denies SOB, headache, vision change, palpitation, abdominal pain. \par Patient is maintaining exercise with walking more than 3 blocks and walking up 3 flight of stairs (48 steps) everyday without any exertional symptoms. \par She continues to try eat healthy yet having trouble keeping Na down. However, she also reports early satiety recently. She feels hunger, yet feels full after 2 spoons or so. However, she can eat sweet food more than that. Trying to eat healthy - more fish and vegetables. trying her best to keep low Na diet. \par Despite her ongoing effort with exercise and diet control, she has been having difficulty with weight loss.\par \par Patient's BP today is 137/77 in office. She checks her BP at home from time to time, and it has been ranging SBP 120s-140s/780-80s. She snores yet denies daytime somnolence, narcolepsy or witnessed apneic event. \par \par \par \par Prior Cardiac Testing:\par TTE (6/7/2021) showed mild global LV dysfunction w/ EF of 49% and eccentric LVH, moderate LAE, mild MR. \par Nuc stress test (was unable to tolerate exercise per patient and was done with regadenoson) showed possible ischemia, and patient was taken to [facility], which showed mild atherosclerosis with no obstructing lesions.

## 2022-02-22 NOTE — ASSESSMENT
[FreeTextEntry1] : 66F w/ PMHx of HTN, HLD, HFmrEF, and varicose vein (L leg), who presented to the clinic for follow up.\par \par # HTN\par - chronic history of HTN (since age of 17).\par - Patient's blood pressure improved on amlodipine 10mg daily, lisinopril 10mg daily, and Coreg 25mg BID, yet still remains slightly hypertensive.\par - Ongoing emphasis on lifestyle modification: patient is willing to try low salt diet, weight loss. \par - Her BMI is at 39, and with HTN + BMI > 35, patient may need evaluation for bariatric surgery. Weight loss with lifestyle modification discussed: patient losing her weight past two visits. f/u w/ PCP recommended. \par - will increase her lisinopril from 10mg daily to 20mg daily.\par - f/u cards clinic in 4 weeks.\par \par # LV Systolic dysfunction\par - TTE (6/7/2021) showed eccentric LVH and global mild LV systolic dysfunction, mild MR and moderate LAE, possibly 2/2 chronic HTN.\par - ischemic eval w/ LHC showed no obstructing lesion (6/9/2021) \par - HTN management as above.\par - c/w CCB, b-blocker and ACEi as above.\par - currently has no symptoms of HF exacerbation. monitor off of diuretics for now.\par \par # primary prophylaxis\par - currently on baby dose ASA 81mg daily.\par - Patient's LHC is negative for obstructing lesion. Given ASCVD risk score of 11%, discussed with patient regarding risk and benefit of baby dose ASA. Pt decided to continue her ASA 81mg daily for now.\par \par # HLD\par - started on statin for HLD w/ ASCVD risk. continue to follow up with PCP.\par \par # HCM\par - EKG today without significant changes from prior ECG.\par \par Case discussed with Dr. Salas. \par

## 2022-02-22 NOTE — REVIEW OF SYSTEMS
[Negative] : Heme/Lymph [FreeTextEntry2] : Obese [FreeTextEntry9] : Leg edema worse on LLE. Varicose vein.

## 2022-03-01 ENCOUNTER — APPOINTMENT (OUTPATIENT)
Dept: INTERNAL MEDICINE | Facility: CLINIC | Age: 67
End: 2022-03-01

## 2022-04-05 ENCOUNTER — APPOINTMENT (OUTPATIENT)
Dept: INTERNAL MEDICINE | Facility: CLINIC | Age: 67
End: 2022-04-05

## 2022-04-19 ENCOUNTER — APPOINTMENT (OUTPATIENT)
Dept: CARDIOLOGY | Facility: HOSPITAL | Age: 67
End: 2022-04-19

## 2022-04-19 VITALS
TEMPERATURE: 98 F | HEIGHT: 65 IN | RESPIRATION RATE: 16 BRPM | BODY MASS INDEX: 38.99 KG/M2 | SYSTOLIC BLOOD PRESSURE: 119 MMHG | HEART RATE: 73 BPM | OXYGEN SATURATION: 98 % | WEIGHT: 234 LBS | DIASTOLIC BLOOD PRESSURE: 79 MMHG

## 2022-04-19 RX ORDER — LISINOPRIL 20 MG/1
20 TABLET ORAL DAILY
Qty: 1 | Refills: 2 | Status: ACTIVE | COMMUNITY
Start: 2021-08-04 | End: 1900-01-01

## 2022-04-19 RX ORDER — CARVEDILOL 25 MG/1
25 TABLET, FILM COATED ORAL
Qty: 60 | Refills: 2 | Status: ACTIVE | COMMUNITY
Start: 1900-01-01 | End: 1900-01-01

## 2022-04-19 RX ORDER — AMLODIPINE BESYLATE 10 MG/1
10 TABLET ORAL DAILY
Qty: 90 | Refills: 1 | Status: ACTIVE | COMMUNITY
Start: 1900-01-01 | End: 1900-01-01

## 2022-04-19 RX ORDER — ASPIRIN 81 MG/1
81 TABLET, COATED ORAL
Qty: 90 | Refills: 1 | Status: ACTIVE | COMMUNITY

## 2022-04-19 NOTE — ASSESSMENT
[FreeTextEntry1] : 66F w/ PMHx of HTN, HLD, HFmrEF, and varicose vein (L leg), who presented to the clinic for follow up.\par \par # HTN\par - chronic history of HTN (since age of 17).\par - Patient's blood pressure improved on amlodipine 10mg daily, lisinopril 20mg daily, and Coreg 25mg BID\par - Ongoing emphasis on lifestyle modification: patient is actively participating and had some weight loss recently.\par - Her BMI is at 39, and with HTN + BMI > 35, patient may need evaluation for bariatric surgery. Weight loss with lifestyle modification discussed: patient losing her weight past two visits. She wishes to continue trying conservative management. f/u w/ PCP recommended. \par - f/u cards clinic in 8 weeks.\par \par # mild LV Systolic dysfunction\par - TTE (6/7/2021) showed eccentric LVH and global mild LV systolic dysfunction, mild MR and moderate LAE, possibly 2/2 chronic HTN.\par - ischemic eval w/ LHC showed no obstructing lesion (6/9/2021) \par - HTN management as above.\par - c/w CCB, b-blocker and ACEi as above.\par - currently has no symptoms of HF exacerbation. monitor off of diuretics for now.\par \par # Sleep Apnea\par - Patient has witnessed apneic events which has improved with weight loss.\par - given patient's clinical picture, patient may have ANDRZEJ/OHS component, that has not been formally diagnosed.\par - patient denies any symptoms of daytime somnolence and narcolepsy.\par - continue to monitor, yet patient may benefit from sleep study if she continues to have witnessed apneic event. Sleep apnea is also associated with blood pressure elevation.\par \par # primary prophylaxis\par - currently on baby dose ASA 81mg daily.\par - Patient's LHC is negative for obstructing lesion. Given ASCVD risk score of 11%, discussed with patient regarding risk and benefit of baby dose ASA. Pt decided to continue her ASA 81mg daily for now.\par \par # HLD\par - started on statin for HLD w/ ASCVD risk. continue to follow up with PCP.\par \par \par Case discussed with Dr. Cruz\par

## 2022-04-19 NOTE — PHYSICAL EXAM
[Well Developed] : well developed [Well Nourished] : well nourished [No Acute Distress] : no acute distress [Obese] : obese [Normal Conjunctiva] : normal conjunctiva [Normal Venous Pressure] : normal venous pressure [No Carotid Bruit] : no carotid bruit [Normal S1, S2] : normal S1, S2 [No Murmur] : no murmur [No Rub] : no rub [No Gallop] : no gallop [Clear Lung Fields] : clear lung fields [Good Air Entry] : good air entry [No Respiratory Distress] : no respiratory distress  [Soft] : abdomen soft [Non Tender] : non-tender [No Masses/organomegaly] : no masses/organomegaly [Normal Bowel Sounds] : normal bowel sounds [Normal Gait] : normal gait [No Cyanosis] : no cyanosis [No Clubbing] : no clubbing [No Varicosities] : no varicosities [No Rash] : no rash [No Skin Lesions] : no skin lesions [Moves all extremities] : moves all extremities [No Focal Deficits] : no focal deficits [Normal Speech] : normal speech [Alert and Oriented] : alert and oriented [Normal memory] : normal memory [de-identified] : Keloid on her ear (R) [de-identified] : Leg edema worse on LLE. Varicose vein.

## 2022-04-19 NOTE — END OF VISIT
[] : Fellow [FreeTextEntry3] : The patient is a 66-year-old woman with chronic heart failure with preserved LV function and hypertension who presents for routine follow-up. The patient is without complaint and doing well. Her blood pressure is well-managed on three medications. She is losing weight and adhering to therapeutic lifestyle changes.\par \par I agree with continuing the current medical regimen.\par \par Robbin Cruz MD\par Cardiology\par [phone]

## 2022-04-19 NOTE — HISTORY OF PRESENT ILLNESS
[FreeTextEntry1] : Mrs. Alcazar is a 66F (immigrated from [place] in 1987) w/ PMHx of HTN (since age of 17), varicose vein (L leg), HFmrEF (EF 49%), who presented to the clinic for follow up.\par \par Patient remains symptom free. She denies CP, SOB, orthopnea, headache, vision change, palpitation, abdominal pain. \par \par Exercise:\par Patient is maintaining exercise with walking more than 3 blocks and walking up 3 flight of stairs (48 steps) everyday without any exertional symptoms (especially at work where there is no elevator). \par \par Diet:\par She continues to try eat healthy as much as possible. She is portioning her food. She is not having any more red meat including beef or pork. She is having more fish and vegetables. trying her best to keep low Na diet. She is no longer using butter and has cut down on cheese. She also is trying to reduce amount of juice she drinks. She occasionally drinks sodas. \par She has lost weight for past two visits and BMI now is down to 38.94 from 40.1\par \par Sleep:\par She used to have witnessed apneic events, yet now has not been snoring as much and now sleeping better. still snores yet denies daytime somnolence, narcolepsy or witnessed apneic event. Per , her breathing during sleep has improved.\par \par Blood pressure monitoring:\par She checks her BP at home from time to time, and it has been ranging SBP 120s/70s recently since last clinic visit.\par \par Urination:\par No difficulty urinating. She makes clean urine. \par \par \par \par Prior Cardiac Testing:\par TTE (6/7/2021) showed mild global LV dysfunction w/ EF of 49% and eccentric LVH, moderate LAE, mild MR. \par Nuc stress test (was unable to tolerate exercise per patient and was done with regadenoson) showed possible ischemia, and patient was taken to [facility], which showed mild atherosclerosis with no obstructing lesions. \par

## 2022-05-10 ENCOUNTER — APPOINTMENT (OUTPATIENT)
Dept: INTERNAL MEDICINE | Facility: CLINIC | Age: 67
End: 2022-05-10

## 2022-06-17 ENCOUNTER — APPOINTMENT (OUTPATIENT)
Dept: INTERNAL MEDICINE | Facility: CLINIC | Age: 67
End: 2022-06-17

## 2022-06-17 ENCOUNTER — NON-APPOINTMENT (OUTPATIENT)
Age: 67
End: 2022-06-17

## 2022-07-05 ENCOUNTER — APPOINTMENT (OUTPATIENT)
Dept: CARDIOLOGY | Facility: HOSPITAL | Age: 67
End: 2022-07-05

## 2022-07-12 ENCOUNTER — APPOINTMENT (OUTPATIENT)
Dept: CARDIOLOGY | Facility: HOSPITAL | Age: 67
End: 2022-07-12

## 2022-07-12 ENCOUNTER — NON-APPOINTMENT (OUTPATIENT)
Age: 67
End: 2022-07-12

## 2022-07-12 VITALS
BODY MASS INDEX: 39.27 KG/M2 | TEMPERATURE: 98.4 F | OXYGEN SATURATION: 99 % | WEIGHT: 236 LBS | SYSTOLIC BLOOD PRESSURE: 108 MMHG | RESPIRATION RATE: 16 BRPM | DIASTOLIC BLOOD PRESSURE: 74 MMHG | HEART RATE: 70 BPM

## 2022-07-12 NOTE — HISTORY OF PRESENT ILLNESS
[FreeTextEntry1] : Mrs. Alcazar is a 66F (immigrated from [place] in 1988) w/ PMHx of HTN (since age of 17), varicose vein (L leg), HFmrEF (EF 49%), who presented to the clinic for follow up.\par \par She had right upper back pain around the shoulder which improved with ASA, but 3 days later she developed severe pain, triggering her to go to the ED. ([facility]) 15th of June: Patient had Xray, EKG, and blood test, which were benign. They offered her Pain medication, yet she declined. Pain subsequently spontaneously disappeared. The pain has not recurred since then.\par She is retiring from her work soon in November.\par Patient remains symptom free today. She denies CP, SOB, orthopnea, headache, vision change, palpitation, abdominal pain. \par \par Exercise:\par Patient is maintaining exercise with walking more than 3 blocks and walking up 3 flight of stairs (48 steps) everyday without any exertional symptoms (especially at work where there is no elevator). \par \par Diet:\par She continues to try eat healthy as much as possible. She is portioning her food. She is not having any more red meat including beef or pork. She is having more fish and vegetables. trying her best to keep low Na diet. She is no longer using butter and has cut down on cheese. She also is trying to reduce amount of juice she drinks. Less OJ and pepsi since last visit. \par Eating more with increased appetite anmd has gained 4 lbs since last visit. No snacks.\par \par Sleep:\par She used to have witnessed apneic events, yet now has not been snoring as much and now sleeping better. still snores yet denies daytime somnolence, narcolepsy or witnessed apneic event. She reports improvement of sleep.\par \par Blood pressure monitoring:\par She checks her BP at home from time to time, and it has been ranging SBP 120s/70s recently since last clinic visit.\par \par Urination:\par No difficulty urinating. She makes clean urine. \par \par Leg swelling\par She has leg swelling especially on LLE with varicose vein. She uses compression stocking during the daytime and leg elevations at night when she sleeps, which improves her symptoms. She tries to ambulate as much as possible.\par \par Prior Cardiac Testing:\par TTE (6/7/2021) showed mild global LV dysfunction w/ EF of 49% and eccentric LVH, moderate LAE, mild MR. \par Nuc stress test (was unable to tolerate exercise per patient and was done with regadenoson) showed possible ischemia, and patient was taken to [facility], which showed mild atherosclerosis with no obstructing lesions. \par

## 2022-07-12 NOTE — REVIEW OF SYSTEMS
[Negative] : Heme/Lymph [Snoring] : snoring [Cough] : no cough [Wheezing] : no wheezing [Coughing Up Blood] : no hemoptysis [FreeTextEntry2] : Obese [FreeTextEntry9] : Leg edema worse on LLE. Varicose vein.

## 2022-07-12 NOTE — PHYSICAL EXAM
[Well Developed] : well developed [Well Nourished] : well nourished [No Acute Distress] : no acute distress [Obese] : obese [Normal Conjunctiva] : normal conjunctiva [Normal Venous Pressure] : normal venous pressure [No Carotid Bruit] : no carotid bruit [Normal S1, S2] : normal S1, S2 [No Murmur] : no murmur [No Rub] : no rub [No Gallop] : no gallop [Clear Lung Fields] : clear lung fields [Good Air Entry] : good air entry [No Respiratory Distress] : no respiratory distress  [Soft] : abdomen soft [Non Tender] : non-tender [No Masses/organomegaly] : no masses/organomegaly [Normal Bowel Sounds] : normal bowel sounds [Normal Gait] : normal gait [No Cyanosis] : no cyanosis [No Clubbing] : no clubbing [No Varicosities] : no varicosities [No Rash] : no rash [No Skin Lesions] : no skin lesions [Moves all extremities] : moves all extremities [No Focal Deficits] : no focal deficits [Normal Speech] : normal speech [Alert and Oriented] : alert and oriented [Normal memory] : normal memory [de-identified] : Keloid on her ear (R) [de-identified] : Leg edema worse on LLE. Varicose vein.

## 2022-07-12 NOTE — ASSESSMENT
[FreeTextEntry1] : 66F w/ PMHx of HTN, HLD, HFmrEF, and varicose vein (L leg), who presented to the clinic for follow up.\par \par # HTN\par - chronic history of HTN (since age of 17).\par - Patient's blood pressure well controlled on amlodipine 10mg daily, lisinopril 20mg daily, and Coreg 25mg BID\par - Ongoing emphasis on lifestyle modification: patient is actively participating.\par - Her BMI is at 39, and with HTN + BMI > 35, patient may need evaluation for bariatric surgery. Weight loss with lifestyle modification discussed: patient losing her weight past two visits. She wishes to continue trying conservative management. f/u w/ PCP recommended. \par - f/u cards clinic in 3 months.\par - Patient to follow up with PCP next week. will need annual basic blood labs.\par \par # mild LV Systolic dysfunction\par - TTE (6/7/2021) showed eccentric LVH and global mild LV systolic dysfunction, mild MR and moderate LAE, possibly 2/2 chronic HTN.\par - ischemic eval w/ LHC showed no obstructing lesion (6/9/2021) \par - EKG today unchanged from prior ECG.\par - HTN management as above.\par - c/w CCB, b-blocker and ACEi as above.\par - currently has no symptoms of HF exacerbation. monitor off of diuretics for now.\par - LE edema worse on Left from varicose vein. Improves with leg elevation at night. encourage active ambulation and compression stocking during daytime.\par \par # Sleep Apnea\par - Patient has witnessed apneic events which has improved with weight loss.\par - given patient's clinical picture, patient may have ANDRZEJ/OHS component, that has not been formally diagnosed.\par - patient denies any symptoms of daytime somnolence and narcolepsy.\par - continue to monitor, yet patient may benefit from sleep study if she continues to have witnessed apneic event. Sleep apnea is also associated with blood pressure elevation.\par \par # primary prophylaxis\par - currently on baby dose ASA 81mg daily.\par - Patient's LHC is negative for obstructing lesion. Given ASCVD risk score of 11%, discussed with patient regarding risk and benefit of baby dose ASA. Pt decided to continue her ASA 81mg daily for now.\par \par # HLD\par - started on statin for HLD w/ ASCVD risk. continue to follow up with PCP.\par

## 2022-07-20 ENCOUNTER — APPOINTMENT (OUTPATIENT)
Dept: INTERNAL MEDICINE | Facility: CLINIC | Age: 67
End: 2022-07-20

## 2022-07-20 ENCOUNTER — OUTPATIENT (OUTPATIENT)
Dept: OUTPATIENT SERVICES | Facility: HOSPITAL | Age: 67
LOS: 1 days | End: 2022-07-20

## 2022-07-20 VITALS
RESPIRATION RATE: 16 BRPM | WEIGHT: 234 LBS | HEART RATE: 63 BPM | OXYGEN SATURATION: 98 % | TEMPERATURE: 97.1 F | HEIGHT: 65 IN | BODY MASS INDEX: 38.99 KG/M2 | SYSTOLIC BLOOD PRESSURE: 102 MMHG | DIASTOLIC BLOOD PRESSURE: 58 MMHG

## 2022-07-20 DIAGNOSIS — Z82.49 FAMILY HISTORY OF ISCHEMIC HEART DISEASE AND OTHER DISEASES OF THE CIRCULATORY SYSTEM: ICD-10-CM

## 2022-07-20 DIAGNOSIS — R74.8 ABNORMAL LEVELS OF OTHER SERUM ENZYMES: ICD-10-CM

## 2022-07-20 DIAGNOSIS — I10 ESSENTIAL (PRIMARY) HYPERTENSION: ICD-10-CM

## 2022-07-20 DIAGNOSIS — Z00.00 ENCOUNTER FOR GENERAL ADULT MEDICAL EXAMINATION W/OUT ABNORMAL FINDINGS: ICD-10-CM

## 2022-07-20 DIAGNOSIS — I50.22 CHRONIC SYSTOLIC (CONGESTIVE) HEART FAILURE: ICD-10-CM

## 2022-07-20 DIAGNOSIS — Z78.9 OTHER SPECIFIED HEALTH STATUS: ICD-10-CM

## 2022-07-20 DIAGNOSIS — Z98.890 OTHER SPECIFIED POSTPROCEDURAL STATES: Chronic | ICD-10-CM

## 2022-07-20 DIAGNOSIS — E78.5 HYPERLIPIDEMIA, UNSPECIFIED: ICD-10-CM

## 2022-07-20 DIAGNOSIS — Z23 ENCOUNTER FOR IMMUNIZATION: ICD-10-CM

## 2022-07-20 PROCEDURE — ZZZZZ: CPT

## 2022-07-20 RX ORDER — ATORVASTATIN CALCIUM 40 MG/1
40 TABLET, FILM COATED ORAL DAILY
Qty: 90 | Refills: 0 | Status: ACTIVE | COMMUNITY
Start: 2022-07-20 | End: 1900-01-01

## 2022-07-20 RX ORDER — ATORVASTATIN CALCIUM 10 MG/1
10 TABLET, FILM COATED ORAL DAILY
Qty: 30 | Refills: 0 | Status: DISCONTINUED | COMMUNITY
Start: 2022-01-25 | End: 2022-07-20

## 2022-07-21 NOTE — PLAN
[FreeTextEntry1] : - started atorvastatin 40mg once a day\par - ordered metastatic bone survey in setting of elevated alk phos \par - will get CMP, CBC, lipid panel on f/u visit in November \par \par Health maintenance:\par - Prevnar 13 given on this visit\par - ordered DEXA scan, patient will go in November after custodial  \par - ordered mammography, patient will go in November after custodial  \par - patient said she would get booster for COVID, 4th dose \par \par Patient will return to clinic in November to f/u

## 2022-07-21 NOTE — REVIEW OF SYSTEMS
[Lower Ext Edema] : lower extremity edema [Negative] : Eyes [Fever] : no fever [Chills] : no chills [Fatigue] : no fatigue [Hot Flashes] : no hot flashes [Night Sweats] : no night sweats [Earache] : no earache [Hearing Loss] : no hearing loss [Nosebleed] : no nosebleeds [Nasal Discharge] : no nasal discharge [Sore Throat] : no sore throat [Chest Pain] : no chest pain [Palpitations] : no palpitations [Leg Claudication] : no leg claudication [Orthopnea] : no orthopnea [Paroxysmal Nocturnal Dyspnea] : no paroxysmal nocturnal dyspnea [Shortness Of Breath] : no shortness of breath [Wheezing] : no wheezing [Cough] : no cough [Dyspnea on Exertion] : no dyspnea on exertion [Abdominal Pain] : no abdominal pain [Nausea] : no nausea [Constipation] : no constipation [Diarrhea] : diarrhea [Vomiting] : no vomiting [Heartburn] : no heartburn [Melena] : no melena [Dysuria] : no dysuria [Incontinence] : no incontinence [Nocturia] : no nocturia [Poor Libido] : libido not poor [Hematuria] : no hematuria [Frequency] : no frequency [Vaginal Discharge] : no vaginal discharge [Dysmenorrhea] : no dysmenorrhea [Joint Pain] : no joint pain [Joint Stiffness] : no joint stiffness [Joint Swelling] : no joint swelling [Muscle Weakness] : no muscle weakness [Muscle Pain] : no muscle pain [Back Pain] : no back pain [Headache] : no headache [Dizziness] : no dizziness [Fainting] : no fainting [Confusion] : no confusion [Memory Loss] : no memory loss [Unsteady Walking] : no ataxia [Easy Bleeding] : no easy bleeding [Easy Bruising] : no easy bruising [Swollen Glands] : no swollen glands

## 2022-07-27 DIAGNOSIS — Z00.00 ENCOUNTER FOR GENERAL ADULT MEDICAL EXAMINATION WITHOUT ABNORMAL FINDINGS: ICD-10-CM

## 2022-07-27 DIAGNOSIS — R74.8 ABNORMAL LEVELS OF OTHER SERUM ENZYMES: ICD-10-CM

## 2022-07-27 DIAGNOSIS — Z82.49 FAMILY HISTORY OF ISCHEMIC HEART DISEASE AND OTHER DISEASES OF THE CIRCULATORY SYSTEM: ICD-10-CM

## 2022-07-27 DIAGNOSIS — I50.22 CHRONIC SYSTOLIC (CONGESTIVE) HEART FAILURE: ICD-10-CM

## 2022-07-27 DIAGNOSIS — Z23 ENCOUNTER FOR IMMUNIZATION: ICD-10-CM

## 2022-07-27 DIAGNOSIS — Z78.9 OTHER SPECIFIED HEALTH STATUS: ICD-10-CM

## 2022-07-27 DIAGNOSIS — I10 ESSENTIAL (PRIMARY) HYPERTENSION: ICD-10-CM

## 2022-10-11 ENCOUNTER — APPOINTMENT (OUTPATIENT)
Dept: CARDIOLOGY | Facility: HOSPITAL | Age: 67
End: 2022-10-11

## 2022-12-07 ENCOUNTER — APPOINTMENT (OUTPATIENT)
Dept: INTERNAL MEDICINE | Facility: CLINIC | Age: 67
End: 2022-12-07

## 2023-01-18 NOTE — PATIENT PROFILE ADULT - HAVE YOU EXPERIENCED VIOLENCE OR A TRAUMATIC EVENT?
Belle from [facility] called and states that they contacted them for portable O2 but her  states that she does not need O2 during the day. Meanwhile Sheyla is telling Belle that she can not breath well.   Please advise if patient should be having daytime O2. I also let her know that the notes say that she is on daytime O2 and that the last 2 visits have been virtual since the hospital visit   no

## 2023-12-10 NOTE — ED PROVIDER NOTE - OBJECTIVE STATEMENT
Group Topic:  Therapeutic Activity    Date: 12/10/2023  Start Time: 1415  End Time: 1500  Facilitators: Roberta Corbin LPC    Focus: Expressing Gratitude   Number in attendance: 9    Expressing gratitude may help patients to feel happier, improve relationships, and potentially even reduce depressive symptoms and suicidal ideation. Expressing gratitude may also boost self-esteem.    Patient was invited to participate in a gratitude game. Patient was asked to select topics/ items to be thankful for.      Patient exited the group session before 5 minutes without participating.     Roberta Corbin LPC     66yo f pmh HTN p/w chest pain. pt states she was walking up 3 flights of steps and developed left chest pain. no associated nausea, + diaphoresis.  had similar episode earlier this week. got nitro and ASA by EMS w/ improved symptoms. does not follow w/ cardiology.